# Patient Record
Sex: FEMALE | Race: WHITE | NOT HISPANIC OR LATINO | Employment: FULL TIME | URBAN - METROPOLITAN AREA
[De-identification: names, ages, dates, MRNs, and addresses within clinical notes are randomized per-mention and may not be internally consistent; named-entity substitution may affect disease eponyms.]

---

## 2017-01-04 DIAGNOSIS — N39.0 URINARY TRACT INFECTIOUS DISEASE: Primary | ICD-10-CM

## 2017-01-06 ENCOUNTER — OFFICE VISIT (OUTPATIENT)
Dept: UROLOGY | Facility: CLINIC | Age: 32
End: 2017-01-06
Payer: COMMERCIAL

## 2017-01-06 DIAGNOSIS — N13.722 VESICOURETERAL REFLUX WITH REFLUX NEPHROPATHY, BILATERAL: Primary | ICD-10-CM

## 2017-01-06 LAB
ALBUMIN UR-MCNC: NEGATIVE MG/DL
APPEARANCE UR: CLEAR
BILIRUB UR QL STRIP: NEGATIVE
COLOR UR AUTO: YELLOW
GLUCOSE UR STRIP-MCNC: NEGATIVE MG/DL
HGB UR QL STRIP: ABNORMAL
KETONES UR STRIP-MCNC: NEGATIVE MG/DL
LEUKOCYTE ESTERASE UR QL STRIP: NEGATIVE
NITRATE UR QL: NEGATIVE
PH UR STRIP: 7 PH (ref 5–7)
RBC #/AREA URNS AUTO: 2 /HPF (ref 0–2)
SP GR UR STRIP: 1.02 (ref 1–1.03)
URN SPEC COLLECT METH UR: ABNORMAL
UROBILINOGEN UR STRIP-ACNC: 0.2 EU/DL (ref 0.2–1)
WBC #/AREA URNS AUTO: <1 /HPF (ref 0–2)

## 2017-01-06 PROCEDURE — 99212 OFFICE O/P EST SF 10 MIN: CPT | Mod: 25 | Performed by: UROLOGY

## 2017-01-06 PROCEDURE — 81001 URINALYSIS AUTO W/SCOPE: CPT | Performed by: UROLOGY

## 2017-01-06 PROCEDURE — 52000 CYSTOURETHROSCOPY: CPT | Performed by: UROLOGY

## 2017-01-06 RX ORDER — CIPROFLOXACIN 500 MG/1
500 TABLET, FILM COATED ORAL ONCE
Qty: 1 TABLET | Refills: 0 | Status: SHIPPED | OUTPATIENT
Start: 2017-01-06 | End: 2017-01-06

## 2017-01-06 NOTE — PROGRESS NOTES
Office Visit Note  [facility]  [phone]    Jan 6, 2017    [unfilled]    1985    UROLOGIC DIAGNOSES:    History of ureteral reimplantations    CURRENT INTERVENTIONS:        History:    Lou returns to clinic today for cystoscopy.  She had a noncontrast  CT scan of the abdomen and pelvis performed on December 15 and it showed no abnormalities.  No hydronephrosis or stones. She reports feeling well at this time with no symptoms.      Imaging:  I reviewed her CT scan images.  CT scan normal. Very small left renal angiomyolipoma     Labs:      MEDICATIONS:    Current outpatient prescriptions:      ciprofloxacin (CIPRO) 500 MG tablet, Take 1 tablet (500 mg) by mouth once for 1 dose, Disp: 1 tablet, Rfl: 0     hydrOXYzine (ATARAX) 25 MG tablet, Take 1-2 tablets (25-50 mg) by mouth every 6 hours as needed for anxiety, Disp: 30 tablet, Rfl: 1     citalopram (CELEXA) 20 MG tablet, Take 1 tablet (20 mg) by mouth daily, Disp: 90 tablet, Rfl: 3     multivitamin, therapeutic with minerals (MULTI-VITAMIN) TABS, Take 1 tablet by mouth daily, Disp: 100 tablet, Rfl: 3     levonorgestrel (MIRENA) 20 MCG/24HR IUD, 1 each (20 mcg) by Intrauterine route once, Disp: 1 each, Rfl: 0     SUMAtriptan (IMITREX) 100 MG tablet, May repeat in 2 hours if needed: max 2/day; use for HA or neck pain, Disp: 9 tablet, Rfl: 1    ALLERGIES:   No Known Allergies    REVIEW OF SYSTEMS: Ten point review of systems without change as outlined in HPI    SURGICAL HISTORY:    Past Surgical History   Procedure Laterality Date     Urinary reflux surgery  age 10     Bladder surgery           PHYSICAL EXAM:    There were no vitals taken for this visit.    HEENT: Normocephalic and atraumatic    Cardiac: Not done    Back/Flank: Not done    CNS/PNS: Alert and oriented    Respiratory: Normal nonlabored breathing    Abdomen: Soft nontender and nondistended    Peripheral Vascular: No peripheral edema    Mental Status: Normal    External  Genitalia: normal    Bladder: normal, no evidence of cystocele    Urethra: normal    Vagina: normal vaginal mucosa    Cystoscopy:  I performed flexible cystoscopy today and the bladder was normal throughout.  No tumors identified throughout the bladder. Adjacent to the left ureteral orifice she had 2 small subepithelial cysts and the urothelium, this is likely a reaction to her prior ureteral reimplant.  No evidence of any malignancy.      Urinalysis:  UA RESULTS:  Recent Labs   Lab Test  01/06/17   1431   11/10/16   1531   COLOR  Yellow   < >  Yellow   APPEARANCE  Clear   < >  Clear   URINEGLC  Negative   < >  Negative   URINEBILI  Negative   < >  Negative   URINEKETONE  Negative   < >  Negative   SG  1.020   < >  1.020   UBLD  Trace*   < >  Small*   URINEPH  7.0   < >  6.0   PROTEIN  Negative   < >  Negative   UROBILINOGEN  0.2   < >  0.2   NITRITE  Negative   < >  Negative   LEUKEST  Negative   < >  Negative   RBCU   --    --   O - 2   WBCU   --    --   O - 2    < > = values in this interval not displayed.         IMPRESSION:    Normal cystoscopy    PLAN:    She has had a normal cystoscopy and a normal CT scan. Her urine sample from today was sent for microscopy to rule out hematuria.  The abnormal is seen on the previous ultrasound was likely these 2 small subepithelial cysts near the left orifice that are benign.  She will follow up with me as needed in the future.    Total Time:  15 minutes  Time in Consultation: 10 minutes       Dale Mcintosh M.D.

## 2017-01-06 NOTE — Clinical Note
1/6/2017       RE: Lou Nix  [address] [facility] [address]     Dear Colleague,    Thank you for referring your patient, Lou Nix, to the [facility] [place] at [facility]. Please see a copy of my visit note below.    Office Visit Note  [facility]  [phone]    Jan 6, 2017    [unfilled]    1985    UROLOGIC DIAGNOSES:    History of ureteral reimplantations    CURRENT INTERVENTIONS:        History:    Lou returns to clinic today for cystoscopy.  She had a noncontrast  CT scan of the abdomen and pelvis performed on December 15 and it showed no abnormalities.  No hydronephrosis or stones. She reports feeling well at this time with no symptoms.      Imaging:  I reviewed her CT scan images.  CT scan normal. Very small left renal angiomyolipoma     Labs:      MEDICATIONS:    Current outpatient prescriptions:      ciprofloxacin (CIPRO) 500 MG tablet, Take 1 tablet (500 mg) by mouth once for 1 dose, Disp: 1 tablet, Rfl: 0     hydrOXYzine (ATARAX) 25 MG tablet, Take 1-2 tablets (25-50 mg) by mouth every 6 hours as needed for anxiety, Disp: 30 tablet, Rfl: 1     citalopram (CELEXA) 20 MG tablet, Take 1 tablet (20 mg) by mouth daily, Disp: 90 tablet, Rfl: 3     multivitamin, therapeutic with minerals (MULTI-VITAMIN) TABS, Take 1 tablet by mouth daily, Disp: 100 tablet, Rfl: 3     levonorgestrel (MIRENA) 20 MCG/24HR IUD, 1 each (20 mcg) by Intrauterine route once, Disp: 1 each, Rfl: 0     SUMAtriptan (IMITREX) 100 MG tablet, May repeat in 2 hours if needed: max 2/day; use for HA or neck pain, Disp: 9 tablet, Rfl: 1    ALLERGIES:   No Known Allergies    REVIEW OF SYSTEMS: Ten point review of systems without change as outlined in HPI    SURGICAL HISTORY:    Past Surgical History   Procedure Laterality Date     Urinary reflux surgery  age 10     Bladder surgery           PHYSICAL EXAM:    There were no  vitals taken for this visit.    HEENT: Normocephalic and atraumatic    Cardiac: Not done    Back/Flank: Not done    CNS/PNS: Alert and oriented    Respiratory: Normal nonlabored breathing    Abdomen: Soft nontender and nondistended    Peripheral Vascular: No peripheral edema    Mental Status: Normal    External Genitalia: normal    Bladder: normal, no evidence of cystocele    Urethra: normal    Vagina: normal vaginal mucosa    Cystoscopy:  I performed flexible cystoscopy today and the bladder was normal throughout.  No tumors identified throughout the bladder. Adjacent to the left ureteral orifice she had 2 small subepithelial cysts and the urothelium, this is likely a reaction to her prior ureteral reimplant.  No evidence of any malignancy.      Urinalysis:  UA RESULTS:  Recent Labs   Lab Test  01/06/17   1431   11/10/16   1531   COLOR  Yellow   < >  Yellow   APPEARANCE  Clear   < >  Clear   URINEGLC  Negative   < >  Negative   URINEBILI  Negative   < >  Negative   URINEKETONE  Negative   < >  Negative   SG  1.020   < >  1.020   UBLD  Trace*   < >  Small*   URINEPH  7.0   < >  6.0   PROTEIN  Negative   < >  Negative   UROBILINOGEN  0.2   < >  0.2   NITRITE  Negative   < >  Negative   LEUKEST  Negative   < >  Negative   RBCU   --    --   O - 2   WBCU   --    --   O - 2    < > = values in this interval not displayed.         IMPRESSION:    Normal cystoscopy          PLAN:    She has had a normal cystoscopy and a normal CT scan. Her urine sample from today was sent for microscopy to rule out hematuria.  The abnormal is seen on the previous ultrasound was likely these 2 small subepithelial cysts near the left orifice that are benign.  She will follow up with me as needed in the future.    Total Time:  15 minutes  Time in Consultation: 10 minutes       Dale Mcintosh M.D.

## 2017-01-06 NOTE — PATIENT INSTRUCTIONS
"     AFTER YOUR CYSTOSCOPY         You have just completed a cystoscopy, or \"cysto\", which allowed your physician to learn more about your bladder (or to remove a stent placed after surgery). We suggest that you continue to avoid caffeine, fruit juice, and alcohol for the next 24 hours, however, you are encouraged to return to your normal activities.       A few things that are considered normal after your cystoscopy:    * small amount of bleeding (or spotting) that clears within the next 24 hours    * slight burning sensation with urination    * sensation to of needing to avoid more frequently    * the feeling of \"air\" in your urine    * mild discomfort that is relieved with Tylonol        Please contact our office promptly if you:    * develop a fever above 101 degrees    * are unable to urinate    * develop bright red blood that does not stop    * severe pain or swelling        And of course, please contact our office with any concerns or questions [phone]      "

## 2017-01-06 NOTE — NURSING NOTE
Pt has signed the consent form stating that we will be doing a CYSTOSCOPY (with or without stent removal) today, and that it is the correct procedure. I verbally confirmed the patient s identity using two indicators, relevant allergies, and that the correct equipment was available. Post-op information given to the pt as needed at check-out. I have sent an appropriate antibiotic to the pharmacy in our building as recommended by the MD.     JEWEL Wright

## 2017-01-06 NOTE — MR AVS SNAPSHOT
"              After Visit Summary   1/6/2017    Lou Nix    MRN: [id number]           Patient Information     Date Of Birth          1985        Visit Information        Provider Department      1/6/2017 2:20 PM Dale Mcintosh MD; [facility] [place]        Today's Diagnoses     Urinary tract infectious disease           Care Instructions         AFTER YOUR CYSTOSCOPY         You have just completed a cystoscopy, or \"cysto\", which allowed your physician to learn more about your bladder (or to remove a stent placed after surgery). We suggest that you continue to avoid caffeine, fruit juice, and alcohol for the next 24 hours, however, you are encouraged to return to your normal activities.       A few things that are considered normal after your cystoscopy:    * small amount of bleeding (or spotting) that clears within the next 24 hours    * slight burning sensation with urination    * sensation to of needing to avoid more frequently    * the feeling of \"air\" in your urine    * mild discomfort that is relieved with Tylonol        Please contact our office promptly if you:    * develop a fever above 101 degrees    * are unable to urinate    * develop bright red blood that does not stop    * severe pain or swelling        And of course, please contact our office with any concerns or questions [phone]            Follow-ups after your visit        Who to contact     If you have questions or need follow up information about today's clinic visit or your schedule please contact [facility] [place] directly at [phone].  Normal or non-critical lab and imaging results will be communicated to you by MyChart, letter or phone within 4 business days after the clinic has received the results. If you do not hear from us within 7 days, please contact the clinic through MyChart or phone. If you have a critical or abnormal lab " result, we will notify you by phone as soon as possible.  Submit refill requests through mytrax or call your pharmacy and they will forward the refill request to us. Please allow 3 business days for your refill to be completed.          Additional Information About Your Visit        [employer] [employer] Information     mytrax gives you secure access to your electronic health record. If you see a primary care provider, you can also send messages to your care team and make appointments. If you have questions, please call your primary care clinic.  If you do not have a primary care provider, please call [phone] and they will assist you.        Care EveryWhere ID     This is your Care EveryWhere ID. This could be used by other organizations to access your [facility] medical records  [id number]         Blood Pressure from Last 3 Encounters:   11/10/16 102/70   10/31/16 114/78   01/27/16 110/76    Weight from Last 3 Encounters:   12/14/16 85.73 kg (189 lb)   11/10/16 86.41 kg (190 lb 8 oz)   10/31/16 85.458 kg (188 lb 6.4 oz)              We Performed the Following     UA without Microscopic [[id number]]          Today's Medication Changes          These changes are accurate as of: 1/6/17  2:56 PM.  If you have any questions, ask your nurse or doctor.               Start taking these medicines.        Dose/Directions    ciprofloxacin 500 MG tablet   Commonly known as:  CIPRO   Used for:  Urinary tract infectious disease        Dose:  500 mg   Take 1 tablet (500 mg) by mouth once for 1 dose   Quantity:  1 tablet   Refills:  0         Stop taking these medicines if you haven't already. Please contact your care team if you have questions.     amoxicillin-clavulanate 875-125 MG per tablet   Commonly known as:  AUGMENTIN                Where to get your medicines      These medications were sent to [facility]/pharmacy #[id number] - [place], [place] - [address]  [address] [facility] [address]     Phone:  [phone] -  ciprofloxacin 500 MG tablet             Primary Care Provider Office Phone # Fax #    Rita Arreguin [phone]       [address] [place] [address]        Thank you!     Thank you for choosing [facility] [place]  for your care. Our goal is always to provide you with excellent care. Hearing back from our patients is one way we can continue to improve our services. Please take a few minutes to complete the written survey that you may receive in the mail after your visit with us. Thank you!             Your Updated Medication List - Protect others around you: Learn how to safely use, store and throw away your medicines at www.disposemymeds.org.          This list is accurate as of: 1/6/17  2:56 PM.  Always use your most recent med list.                   Brand Name Dispense Instructions for use    ciprofloxacin 500 MG tablet    CIPRO    1 tablet    Take 1 tablet (500 mg) by mouth once for 1 dose       citalopram 20 MG tablet    celeXA    90 tablet    Take 1 tablet (20 mg) by mouth daily       hydrOXYzine 25 MG tablet    ATARAX    30 tablet    Take 1-2 tablets (25-50 mg) by mouth every 6 hours as needed for anxiety       levonorgestrel 20 MCG/24HR IUD    MIRENA    1 each    1 each (20 mcg) by Intrauterine route once       Multi-vitamin Tabs tablet     100 tablet    Take 1 tablet by mouth daily       SUMAtriptan 100 MG tablet    IMITREX    9 tablet    May repeat in 2 hours if needed: max 2/day; use for HA or neck pain

## 2017-05-25 ENCOUNTER — MYC MEDICAL ADVICE (OUTPATIENT)
Dept: PEDIATRICS | Facility: CLINIC | Age: 32
End: 2017-05-25

## 2017-05-25 ENCOUNTER — TELEPHONE (OUTPATIENT)
Dept: PEDIATRICS | Facility: CLINIC | Age: 32
End: 2017-05-25

## 2017-05-25 NOTE — LETTER
My Depression Action Plan  Name: Lou Nix   Date of Birth 1985  Date: 5/25/2017    My doctor: Rita Arreguin   My clinic: [facility]  [address]  [address]  [place] [address] [phone]          GREEN    ZONE   Good Control    What it looks like:     Things are going generally well. You have normal up s and down s. You may even feel depressed from time to time, but bad moods usually last less than a day.   What you need to do:  1. Continue to care for yourself (see self care plan)  2. Check your depression survival kit and update it as needed  3. Follow your physician s recommendations including any medication.  4. Do not stop taking medication unless you consult with your physician first.           YELLOW         ZONE Getting Worse    What it looks like:     Depression is starting to interfere with your life.     It may be hard to get out of bed; you may be starting to isolate yourself from others.    Symptoms of depression are starting to last most all day and this has happened for several days.     You may have suicidal thoughts but they are not constant.   What you need to do:     1. Call your care team, your response to treatment will improve if you keep your care team informed of your progress. Yellow periods are signs an adjustment may need to be made.     2. Continue your self-care, even if you have to fake it!    3. Talk to someone in your support network    4. Open up your depression survival kit           RED    ZONE Medical Alert - Get Help    What it looks like:     Depression is seriously interfering with your life.     You may experience these or other symptoms: You can t get out of bed most days, can t work or engage in other necessary activities, you have trouble taking care of basic hygiene, or basic responsibilities, thoughts of suicide or death that will not go away, self-injurious behavior.     What you need to do:  1. Call  your care team and request a same-day appointment. If they are not available (weekends or after hours) call your local crisis line, emergency room or 911.      Electronically signed by: Carol Monroe, May 25, 2017    Depression Self Care Plan / Survival Kit    Self-Care for Depression  Here s the deal. Your body and mind are really not as separate as most people think.  What you do and think affects how you feel and how you feel influences what you do and think. This means if you do things that people who feel good do, it will help you feel better.  Sometimes this is all it takes.  There is also a place for medication and therapy depending on how severe your depression is, so be sure to consult with your medical provider and/ or Behavioral Health Consultant if your symptoms are worsening or not improving.     In order to better manage my stress, I will:    Exercise  Get some form of exercise, every day. This will help reduce pain and release endorphins, the  feel good  chemicals in your brain. This is almost as good as taking antidepressants!  This is not the same as joining a gym and then never going! (they count on that by the way ) It can be as simple as just going for a walk or doing some gardening, anything that will get you moving.      Hygiene   Maintain good hygiene (Get out of bed in the morning, Make your bed, Brush your teeth, Take a shower, and Get dressed like you were going to work, even if you are unemployed).  If your clothes don't fit try to get ones that do.    Diet  I will strive to eat foods that are good for me, drink plenty of water, and avoid excessive sugar, caffeine, alcohol, and other mood-altering substances.  Some foods that are helpful in depression are: complex carbohydrates, B vitamins, flaxseed, fish or fish oil, fresh fruits and vegetables.    Psychotherapy  I agree to participate in Individual Therapy (if recommended).    Medication  If prescribed medications, I agree to  take them.  Missing doses can result in serious side effects.  I understand that drinking alcohol, or other illicit drug use, may cause potential side effects.  I will not stop my medication abruptly without first discussing it with my provider.    Staying Connected With Others  I will stay in touch with my friends, family members, and my primary care provider/team.    Use your imagination  Be creative.  We all have a creative side; it doesn t matter if it s oil painting, sand castles, or mud pies! This will also kick up the endorphins.    Witness Beauty  (AKA stop and smell the roses) Take a look outside, even in mid-winter. Notice colors, textures. Watch the squirrels and birds.     Service to others  Be of service to others.  There is always someone else in need.  By helping others we can  get out of ourselves  and remember the really important things.  This also provides opportunities for practicing all the other parts of the program.    Humor  Laugh and be silly!  Adjust your TV habits for less news and crime-drama and more comedy.    Control your stress  Try breathing deep, massage therapy, biofeedback, and meditation. Find time to relax each day.     My support system    Clinic Contact:  Phone number:    Contact 1:  Phone number:    Contact 2:  Phone number:    [facility]/:  Phone number:    Therapist:  Phone number:    Local crisis center:    Phone number:    Other community support:  Phone number:

## 2017-07-18 NOTE — TELEPHONE ENCOUNTER
Chief Complaint   Patient presents with    Annual Wellness Visit     blood pressure recheck     1. Have you been to the ER, urgent care clinic since your last visit? Hospitalized since your last visit? No    2. Have you seen or consulted any other health care providers outside of the [address] [place] [address] since your last visit? Include any pap smears or colon screening. No     Hx htn    Knees are bothering patient a lot--wants to see about getting referral to orthopeadic specialist--thinking about knee replacements    Had eye exam last year    Needs pneumococcal vaccine    Had a colonsopy within the last couple years--everyting came back normal      General Health Questions   -During the past 4 weeks:   -how would you rate your health in general? Fair   -how often have you been bothered by feeling dizzy when standing up? 0   -how much have you been bothered by bodily pain? moderately   -Have you noticed any hearing difficulties? no   -has your physical and emotional health limited your social activities with family or friends? no    Emotional Health Questions   -Do you have a history of depression, anxiety, or emotional problems? no  -Over the past 2 weeks, have you felt down, depressed or hopeless? no  -Over the past 2 weeks, have you felt little interest or pleasure in doing things? no    Health Habits   Please describe your diet habits: lower carv  Do you get 5 servings of fruits or vegetables daily? yes  Do you exercise regularly? yes    Activities of Daily Living and Functional Status   -Do you need help with eating, walking, dressing, bathing, toileting, the phone, transportation, shopping, preparing meals, housework, laundry, medications or managing money? no  -In the past four weeks, was someone available to help you if you needed and wanted help with anything? yes  -Are you confident are you that you can control and manage most of your health problems?  yes  -Have you been given information to help you Type of outreach:  Phone, left message for patient to call back.   Health Maintenance Due   Topic Date Due     DEPRESSION ACTION PLAN Q1 YR  01/27/2017     PHQ-9 Q6 MONTHS  05/10/2017     Updated DAP, please update PHQ 9 if pt calls back.     Carol Monroe MA       keep track of your medications? yes  -How often do you have trouble taking your medications as prescribed? not    Fall Risk and Home Safety   Have you fallen 2 or more times in the past year? no  Does your home have rugs in the hallway, lack grab bars in the bathroom, lack handrails on the stairs or have poor lighting? no  Do you have smoke detectors and check them regularly?  yes  Do you have difficulties driving a car? no  Do you always fasten your seat belt when you are in a car?no

## 2017-12-05 ASSESSMENT — PATIENT HEALTH QUESTIONNAIRE - PHQ9
SUM OF ALL RESPONSES TO PHQ QUESTIONS 1-9: 7
SUM OF ALL RESPONSES TO PHQ QUESTIONS 1-9: 7
10. IF YOU CHECKED OFF ANY PROBLEMS, HOW DIFFICULT HAVE THESE PROBLEMS MADE IT FOR YOU TO DO YOUR WORK, TAKE CARE OF THINGS AT HOME, OR GET ALONG WITH OTHER PEOPLE: NOT DIFFICULT AT ALL

## 2017-12-05 NOTE — TELEPHONE ENCOUNTER
PHQ9 updated, score is 7. Advised on appointment.     PHQ-9 score:    PHQ-9 SCORE 12/5/2017   Total Score -   Total Score MyChart 7 (Mild depression)   Total Score 7

## 2017-12-06 ASSESSMENT — PATIENT HEALTH QUESTIONNAIRE - PHQ9: SUM OF ALL RESPONSES TO PHQ QUESTIONS 1-9: 7

## 2018-07-05 ENCOUNTER — OFFICE VISIT (OUTPATIENT)
Dept: PEDIATRICS | Facility: CLINIC | Age: 33
End: 2018-07-05
Payer: COMMERCIAL

## 2018-07-05 VITALS
WEIGHT: 178 LBS | DIASTOLIC BLOOD PRESSURE: 64 MMHG | TEMPERATURE: 98.3 F | SYSTOLIC BLOOD PRESSURE: 96 MMHG | HEIGHT: 66 IN | OXYGEN SATURATION: 98 % | HEART RATE: 67 BPM | BODY MASS INDEX: 28.61 KG/M2

## 2018-07-05 DIAGNOSIS — Z00.00 ROUTINE GENERAL MEDICAL EXAMINATION AT A HEALTH CARE FACILITY: Primary | ICD-10-CM

## 2018-07-05 DIAGNOSIS — Z23 NEED FOR HEPATITIS A AND B VACCINATION: ICD-10-CM

## 2018-07-05 DIAGNOSIS — F41.1 GAD (GENERALIZED ANXIETY DISORDER): ICD-10-CM

## 2018-07-05 DIAGNOSIS — F33.8 SEASONAL AFFECTIVE DISORDER (H): ICD-10-CM

## 2018-07-05 DIAGNOSIS — R79.89 ELEVATED LIVER FUNCTION TESTS: ICD-10-CM

## 2018-07-05 DIAGNOSIS — G43.009 MIGRAINE WITHOUT AURA AND WITHOUT STATUS MIGRAINOSUS, NOT INTRACTABLE: ICD-10-CM

## 2018-07-05 DIAGNOSIS — N39.3 FEMALE STRESS INCONTINENCE: ICD-10-CM

## 2018-07-05 DIAGNOSIS — F32.0 MAJOR DEPRESSIVE DISORDER, SINGLE EPISODE, MILD (H): ICD-10-CM

## 2018-07-05 DIAGNOSIS — D22.9 MULTIPLE PIGMENTED NEVI: ICD-10-CM

## 2018-07-05 LAB
ALBUMIN SERPL-MCNC: 3.9 G/DL (ref 3.4–5)
ALP SERPL-CCNC: 53 U/L (ref 40–150)
ALT SERPL W P-5'-P-CCNC: 55 U/L (ref 0–50)
ANION GAP SERPL CALCULATED.3IONS-SCNC: 5 MMOL/L (ref 3–14)
AST SERPL W P-5'-P-CCNC: 23 U/L (ref 0–45)
BILIRUB SERPL-MCNC: 1.1 MG/DL (ref 0.2–1.3)
BUN SERPL-MCNC: 17 MG/DL (ref 7–30)
CALCIUM SERPL-MCNC: 9.2 MG/DL (ref 8.5–10.1)
CHLORIDE SERPL-SCNC: 106 MMOL/L (ref 94–109)
CHOLEST SERPL-MCNC: 171 MG/DL
CO2 SERPL-SCNC: 29 MMOL/L (ref 20–32)
CREAT SERPL-MCNC: 0.86 MG/DL (ref 0.52–1.04)
ERYTHROCYTE [DISTWIDTH] IN BLOOD BY AUTOMATED COUNT: 11.6 % (ref 10–15)
GFR SERPL CREATININE-BSD FRML MDRD: 77 ML/MIN/1.7M2
GLUCOSE SERPL-MCNC: 91 MG/DL (ref 70–99)
HCT VFR BLD AUTO: 40.4 % (ref 35–47)
HDLC SERPL-MCNC: 41 MG/DL
HGB BLD-MCNC: 14.8 G/DL (ref 11.7–15.7)
LDLC SERPL CALC-MCNC: 110 MG/DL
MCH RBC QN AUTO: 32.2 PG (ref 26.5–33)
MCHC RBC AUTO-ENTMCNC: 36.6 G/DL (ref 31.5–36.5)
MCV RBC AUTO: 88 FL (ref 78–100)
NONHDLC SERPL-MCNC: 130 MG/DL
PLATELET # BLD AUTO: 285 10E9/L (ref 150–450)
POTASSIUM SERPL-SCNC: 3.9 MMOL/L (ref 3.4–5.3)
PROT SERPL-MCNC: 6.9 G/DL (ref 6.8–8.8)
RBC # BLD AUTO: 4.59 10E12/L (ref 3.8–5.2)
SODIUM SERPL-SCNC: 140 MMOL/L (ref 133–144)
TRIGL SERPL-MCNC: 101 MG/DL
WBC # BLD AUTO: 6.2 10E9/L (ref 4–11)

## 2018-07-05 PROCEDURE — 99395 PREV VISIT EST AGE 18-39: CPT | Mod: 25 | Performed by: PEDIATRICS

## 2018-07-05 PROCEDURE — 80061 LIPID PANEL: CPT | Performed by: PEDIATRICS

## 2018-07-05 PROCEDURE — 90471 IMMUNIZATION ADMIN: CPT | Performed by: PEDIATRICS

## 2018-07-05 PROCEDURE — 85027 COMPLETE CBC AUTOMATED: CPT | Performed by: PEDIATRICS

## 2018-07-05 PROCEDURE — 90636 HEP A/HEP B VACC ADULT IM: CPT | Performed by: PEDIATRICS

## 2018-07-05 PROCEDURE — 80053 COMPREHEN METABOLIC PANEL: CPT | Performed by: PEDIATRICS

## 2018-07-05 PROCEDURE — 36415 COLL VENOUS BLD VENIPUNCTURE: CPT | Performed by: PEDIATRICS

## 2018-07-05 PROCEDURE — 99213 OFFICE O/P EST LOW 20 MIN: CPT | Mod: 25 | Performed by: PEDIATRICS

## 2018-07-05 RX ORDER — SUMATRIPTAN 100 MG/1
TABLET, FILM COATED ORAL
Qty: 9 TABLET | Refills: 3 | Status: SHIPPED | OUTPATIENT
Start: 2018-07-05 | End: 2019-12-31

## 2018-07-05 ASSESSMENT — ENCOUNTER SYMPTOMS
FREQUENCY: 0
NAUSEA: 0
COUGH: 0
CONSTIPATION: 0
DIARRHEA: 0
ABDOMINAL PAIN: 0
PARESTHESIAS: 0
HEMATOCHEZIA: 0
WEAKNESS: 0
PALPITATIONS: 0
MYALGIAS: 0
BREAST MASS: 0
DYSURIA: 0
HEARTBURN: 0
NERVOUS/ANXIOUS: 0
DIZZINESS: 0
ARTHRALGIAS: 0
HEMATURIA: 0
JOINT SWELLING: 0
SHORTNESS OF BREATH: 0
HEADACHES: 0
CHILLS: 0
EYE PAIN: 0
SORE THROAT: 0
FEVER: 0

## 2018-07-05 ASSESSMENT — ANXIETY QUESTIONNAIRES
2. NOT BEING ABLE TO STOP OR CONTROL WORRYING: NOT AT ALL
3. WORRYING TOO MUCH ABOUT DIFFERENT THINGS: SEVERAL DAYS
1. FEELING NERVOUS, ANXIOUS, OR ON EDGE: SEVERAL DAYS
GAD7 TOTAL SCORE: 3
5. BEING SO RESTLESS THAT IT IS HARD TO SIT STILL: NOT AT ALL
IF YOU CHECKED OFF ANY PROBLEMS ON THIS QUESTIONNAIRE, HOW DIFFICULT HAVE THESE PROBLEMS MADE IT FOR YOU TO DO YOUR WORK, TAKE CARE OF THINGS AT HOME, OR GET ALONG WITH OTHER PEOPLE: NOT DIFFICULT AT ALL
6. BECOMING EASILY ANNOYED OR IRRITABLE: SEVERAL DAYS
7. FEELING AFRAID AS IF SOMETHING AWFUL MIGHT HAPPEN: NOT AT ALL

## 2018-07-05 ASSESSMENT — PATIENT HEALTH QUESTIONNAIRE - PHQ9: 5. POOR APPETITE OR OVEREATING: NOT AT ALL
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
## 2018-07-05 NOTE — MR AVS SNAPSHOT
After Visit Summary   7/5/2018    Lou Nix    MRN: [id number]           Patient Information     Date Of Birth          1985        Visit Information        Provider Department      7/5/2018 8:00 AM Rita Arreguin MD [facility]        Today's Diagnoses     Routine general medical examination at a health care facility    -  1    Cervicalgia        Female stress incontinence        Need for hepatitis A and B vaccination          Care Instructions    Expect a phone call to schedule pelvic floor physical therapy - alert me if not improving    Think about counseling    Winter exercise plan - keep an eye your mood    Labs today - first floor    Hep A/B shot today - next one in 1 month, last one at 6 months            Preventive Health Recommendations  Female Ages 26 - 39  Yearly exam:   See your health care provider every year in order to    Review health changes.     Discuss preventive care.      Review your medicines if you your doctor has prescribed any.    Until age 30: Get a Pap test every three years (more often if you have had an abnormal result).    After age 30: Talk to your doctor about whether you should have a Pap test every 3 years or have a Pap test with HPV screening every 5 years.   You do not need a Pap test if your uterus was removed (hysterectomy) and you have not had cancer.  You should be tested each year for STDs (sexually transmitted diseases), if you're at risk.   Talk to your provider about how often to have your cholesterol checked.  If you are at risk for diabetes, you should have a diabetes test (fasting glucose).  Shots: Get a flu shot each year. Get a tetanus shot every 10 years.   Nutrition:     Eat at least 5 servings of fruits and vegetables each day.    Eat whole-grain bread, whole-wheat pasta and brown rice instead of white grains and rice.    Get adequate Calcium and Vitamin D.     Lifestyle    Exercise at least 150 minutes a week (30 minutes  "a day, 5 days of the week). This will help you control your weight and prevent disease.    Limit alcohol to one drink per day.    No smoking.     Wear sunscreen to prevent skin cancer.    See your dentist every six months for an exam and cleaning.            Follow-ups after your visit        Additional Services     PHYSICAL THERAPY REFERRAL       *This therapy referral will be filtered to a centralized scheduling office at [facility] and the patient will receive a call to schedule an appointment at a [facility] location most convenient for them. *     [facility] provides Physical Therapy evaluation and treatment and many specialty services across the [facility] system.  If requesting a specialty program, please choose from the list below.    If you have not heard from the scheduling office within 2 business days, please call [phone] for all locations, with the exception of [place], please call [phone] and [facility], please call [phone]  Treatment: Evaluation & Treatment  Special Instructions/Modalities: pelvic floor physical therapy  Special Programs: Incontinence Pelvic Floor Program    Please be aware that coverage of these services is subject to the terms and limitations of your health insurance plan.  Call member services at your health plan with any benefit or coverage questions.      **Note to Provider:  If you are referring outside of [facility] for the therapy appointment, please list the name of the location in the \"special instructions\" above, print the referral and give to the patient to schedule the appointment.                  Who to contact     If you have questions or need follow up information about today's clinic visit or your schedule please contact [facility] directly at [phone].  Normal or non-critical lab and imaging results will be communicated to you by MyChart, letter or phone within 4 business days after the clinic has " "received the results. If you do not hear from us within 7 days, please contact the clinic through [employer] or phone. If you have a critical or abnormal lab result, we will notify you by phone as soon as possible.  Submit refill requests through [employer] or call your pharmacy and they will forward the refill request to us. Please allow 3 business days for your refill to be completed.          Additional Information About Your Visit        [employer] Information     [employer] gives you secure access to your electronic health record. If you see a primary care provider, you can also send messages to your care team and make appointments. If you have questions, please call your primary care clinic.  If you do not have a primary care provider, please call [phone] and they will assist you.        Care EveryWhere ID     This is your Care EveryWhere ID. This could be used by other organizations to access your [facility] medical records  [id number]        Your Vitals Were     Pulse Temperature Height Last Period Pulse Oximetry BMI (Body Mass Index)    67 98.3  F (36.8  C) (Tympanic) 5' 5.5\" (1.664 m) 06/20/2018 (Approximate) 98% 29.17 kg/m2       Blood Pressure from Last 3 Encounters:   07/05/18 96/64   11/10/16 102/70   10/31/16 114/78    Weight from Last 3 Encounters:   07/05/18 178 lb (80.7 kg)   12/14/16 189 lb (85.7 kg)   11/10/16 190 lb 8 oz (86.4 kg)              We Performed the Following     CBC with platelets     Comprehensive metabolic panel     DEPRESSION ACTION PLAN (DAP)     HEPA/HEPB VACCINE ADULT IM     Lipid panel reflex to direct LDL Fasting     PHYSICAL THERAPY REFERRAL          Where to get your medicines      These medications were sent to [facility] [address] [facility] [address] AT [place] [address]  [address] [facility] [address]     Phone:  [phone]     SUMAtriptan 100 MG tablet          Primary Care Provider Office Phone # Fax #    Rita Arreguin, " [phone] [address] [place] [address]        Equal Access to Services     [facility] : Hadii aad ku hadmandeepmarianne Andreeali, wayuanda yulimahendraha, dianata kalatonyada marinajob, waxrima cristhianin hayaajean marie graykyree harley laHeatherjorden gregg. So [facility] [phone].    ATENCIÓN: Si habla español, tiene a gregory disposición servicios gratuitos de asistencia lingüística. Llame al [phone].    We comply with applicable federal civil rights laws and [place] laws. We do not discriminate on the basis of race, color, national origin, age, disability, sex, sexual orientation, or gender identity.            Thank you!     Thank you for choosing [facility]  for your care. Our goal is always to provide you with excellent care. Hearing back from our patients is one way we can continue to improve our services. Please take a few minutes to complete the written survey that you may receive in the mail after your visit with us. Thank you!             Your Updated Medication List - Protect others around you: Learn how to safely use, store and throw away your medicines at www.disposemymeds.org.          This list is accurate as of 7/5/18  8:52 AM.  Always use your most recent med list.                   Brand Name Dispense Instructions for use Diagnosis    levonorgestrel 20 MCG/24HR IUD    MIRENA    1 each    1 each (20 mcg) by Intrauterine route once    Routine general medical examination at a health care facility       Multi-vitamin Tabs tablet     100 tablet    Take 1 tablet by mouth daily    Routine general medical examination at a health care facility       SUMAtriptan 100 MG tablet    IMITREX    9 tablet    May repeat in 2 hours if needed: max 2/day; use for HA or neck pain    Cervicalgia

## 2018-07-05 NOTE — LETTER
My Depression Action Plan  Name: Lou Nix   Date of Birth 1985  Date: 7/5/2018    My doctor: Rita Arreguin   My clinic: [facility]  [address]  [address]  [place] [address] [phone]          GREEN    ZONE   Good Control    What it looks like:     Things are going generally well. You have normal up s and down s. You may even feel depressed from time to time, but bad moods usually last less than a day.   What you need to do:  1. Continue to care for yourself (see self care plan)  2. Check your depression survival kit and update it as needed  3. Follow your physician s recommendations including any medication.  4. Do not stop taking medication unless you consult with your physician first.           YELLOW         ZONE Getting Worse    What it looks like:     Depression is starting to interfere with your life.     It may be hard to get out of bed; you may be starting to isolate yourself from others.    Symptoms of depression are starting to last most all day and this has happened for several days.     You may have suicidal thoughts but they are not constant.   What you need to do:     1. Call your care team, your response to treatment will improve if you keep your care team informed of your progress. Yellow periods are signs an adjustment may need to be made.     2. Continue your self-care, even if you have to fake it!    3. Talk to someone in your support network    4. Open up your depression survival kit           RED    ZONE Medical Alert - Get Help    What it looks like:     Depression is seriously interfering with your life.     You may experience these or other symptoms: You can t get out of bed most days, can t work or engage in other necessary activities, you have trouble taking care of basic hygiene, or basic responsibilities, thoughts of suicide or death that will not go away, self-injurious behavior.     What you need to do:  1. Call your  care team and request a same-day appointment. If they are not available (weekends or after hours) call your local crisis line, emergency room or 911.            Depression Self Care Plan / Survival Kit    Self-Care for Depression  Here s the deal. Your body and mind are really not as separate as most people think.  What you do and think affects how you feel and how you feel influences what you do and think. This means if you do things that people who feel good do, it will help you feel better.  Sometimes this is all it takes.  There is also a place for medication and therapy depending on how severe your depression is, so be sure to consult with your medical provider and/ or Behavioral Health Consultant if your symptoms are worsening or not improving.     In order to better manage my stress, I will:    Exercise  Get some form of exercise, every day. This will help reduce pain and release endorphins, the  feel good  chemicals in your brain. This is almost as good as taking antidepressants!  This is not the same as joining a gym and then never going! (they count on that by the way ) It can be as simple as just going for a walk or doing some gardening, anything that will get you moving.      Hygiene   Maintain good hygiene (Get out of bed in the morning, Make your bed, Brush your teeth, Take a shower, and Get dressed like you were going to work, even if you are unemployed).  If your clothes don't fit try to get ones that do.    Diet  I will strive to eat foods that are good for me, drink plenty of water, and avoid excessive sugar, caffeine, alcohol, and other mood-altering substances.  Some foods that are helpful in depression are: complex carbohydrates, B vitamins, flaxseed, fish or fish oil, fresh fruits and vegetables.    Psychotherapy  I agree to participate in Individual Therapy (if recommended).    Medication  If prescribed medications, I agree to take them.  Missing doses can result in serious side effects.  I  understand that drinking alcohol, or other illicit drug use, may cause potential side effects.  I will not stop my medication abruptly without first discussing it with my provider.    Staying Connected With Others  I will stay in touch with my friends, family members, and my primary care provider/team.    Use your imagination  Be creative.  We all have a creative side; it doesn t matter if it s oil painting, sand castles, or mud pies! This will also kick up the endorphins.    Witness Beauty  (AKA stop and smell the roses) Take a look outside, even in mid-winter. Notice colors, textures. Watch the squirrels and birds.     Service to others  Be of service to others.  There is always someone else in need.  By helping others we can  get out of ourselves  and remember the really important things.  This also provides opportunities for practicing all the other parts of the program.    Humor  Laugh and be silly!  Adjust your TV habits for less news and crime-drama and more comedy.    Control your stress  Try breathing deep, massage therapy, biofeedback, and meditation. Find time to relax each day.     My support system    Clinic Contact:  Phone number:    Contact 1:  Phone number:    Contact 2:  Phone number:    Faith/:  Phone number:    Therapist:  Phone number:    Local crisis center:    Phone number:    Other community support:  Phone number:

## 2018-07-06 ENCOUNTER — MYC MEDICAL ADVICE (OUTPATIENT)
Dept: PEDIATRICS | Facility: CLINIC | Age: 33
End: 2018-07-06

## 2018-07-06 ASSESSMENT — PATIENT HEALTH QUESTIONNAIRE - PHQ9: SUM OF ALL RESPONSES TO PHQ QUESTIONS 1-9: 3

## 2018-07-06 ASSESSMENT — ANXIETY QUESTIONNAIRES: GAD7 TOTAL SCORE: 3

## 2018-07-06 NOTE — TELEPHONE ENCOUNTER
Question about lab results-liver function test.  Informed pt of 's recommendation.    Delphine Alvarado RN

## 2018-07-20 ENCOUNTER — NURSE TRIAGE (OUTPATIENT)
Dept: NURSING | Facility: CLINIC | Age: 33
End: 2018-07-20

## 2018-07-20 ENCOUNTER — OFFICE VISIT (OUTPATIENT)
Dept: URGENT CARE | Facility: URGENT CARE | Age: 33
End: 2018-07-20
Payer: COMMERCIAL

## 2018-07-20 ENCOUNTER — TELEPHONE (OUTPATIENT)
Dept: URGENT CARE | Facility: URGENT CARE | Age: 33
End: 2018-07-20

## 2018-07-20 VITALS
HEART RATE: 55 BPM | DIASTOLIC BLOOD PRESSURE: 70 MMHG | TEMPERATURE: 98.3 F | SYSTOLIC BLOOD PRESSURE: 114 MMHG | OXYGEN SATURATION: 98 %

## 2018-07-20 DIAGNOSIS — R10.9 FLANK PAIN: ICD-10-CM

## 2018-07-20 DIAGNOSIS — N10 ACUTE PYELONEPHRITIS: Primary | ICD-10-CM

## 2018-07-20 LAB
ALBUMIN UR-MCNC: NEGATIVE MG/DL
APPEARANCE UR: CLEAR
BACTERIA #/AREA URNS HPF: ABNORMAL /HPF
BILIRUB UR QL STRIP: NEGATIVE
COLOR UR AUTO: YELLOW
GLUCOSE UR STRIP-MCNC: NEGATIVE MG/DL
HGB UR QL STRIP: ABNORMAL
KETONES UR STRIP-MCNC: NEGATIVE MG/DL
LEUKOCYTE ESTERASE UR QL STRIP: ABNORMAL
NITRATE UR QL: NEGATIVE
NON-SQ EPI CELLS #/AREA URNS LPF: ABNORMAL /LPF
PH UR STRIP: 6.5 PH (ref 5–7)
RBC #/AREA URNS AUTO: ABNORMAL /HPF
SOURCE: ABNORMAL
SP GR UR STRIP: <=1.005 (ref 1–1.03)
UROBILINOGEN UR STRIP-ACNC: 0.2 EU/DL (ref 0.2–1)
WBC #/AREA URNS AUTO: ABNORMAL /HPF

## 2018-07-20 PROCEDURE — 87186 SC STD MICRODIL/AGAR DIL: CPT | Performed by: FAMILY MEDICINE

## 2018-07-20 PROCEDURE — 99213 OFFICE O/P EST LOW 20 MIN: CPT | Performed by: FAMILY MEDICINE

## 2018-07-20 PROCEDURE — 81001 URINALYSIS AUTO W/SCOPE: CPT | Performed by: FAMILY MEDICINE

## 2018-07-20 PROCEDURE — 87088 URINE BACTERIA CULTURE: CPT | Performed by: FAMILY MEDICINE

## 2018-07-20 PROCEDURE — 87086 URINE CULTURE/COLONY COUNT: CPT | Performed by: FAMILY MEDICINE

## 2018-07-20 RX ORDER — CIPROFLOXACIN 500 MG/1
500 TABLET, FILM COATED ORAL 2 TIMES DAILY
Qty: 14 TABLET | Refills: 0 | Status: SHIPPED | OUTPATIENT
Start: 2018-07-20 | End: 2018-07-27

## 2018-07-20 RX ORDER — CEFTRIAXONE 1 G/1
1000 INJECTION, POWDER, FOR SOLUTION INTRAMUSCULAR; INTRAVENOUS ONCE
Qty: 10 ML | Refills: 0 | OUTPATIENT
Start: 2018-07-20 | End: 2018-07-20

## 2018-07-20 NOTE — PATIENT INSTRUCTIONS
Drink plenty of liquids.     Go to the emergency room if you develop severe lightheadedness, if you develop severe vomiting, or if you develop worsening fevers.      follow up with the primary care provider if not better in 7 days.

## 2018-07-20 NOTE — TELEPHONE ENCOUNTER
Patient calling to report that she was seen in the Urgent Care in [place] today and given prescription for Cipro to treat her kidney infection. Patient states she was reading and read that Cipro can cause ruptured tendons and she is running a triathlon in 3 weeks. Patient states that her sister had a ruptured tendon after taking Cipro. Patient is advised that I will have Dr Ramos, or his nurse, at Urgent Care in [place] call her back. Patient agrees with plan. Gabino at [place] [facility] is reached on the backline number, [phone], and advised of patient's concern. Camille will review this with Dr Ramos and one of the two of them will call patient back at [phone].  Reason for Disposition    Caller has URGENT medication question about med that PCP prescribed and triager unable to answer question    Additional Information    Negative: Drug overdose and nurse unable to answer question    Negative: Caller requesting information not related to medicine    Negative: Caller requesting a prescription for Strep throat and has a positive culture result    Negative: Rash while taking a medication or within 3 days of stopping it    Negative: Immunization reaction suspected    Negative: [1] Asthma and [2] having symptoms of asthma (cough, wheezing, etc)    Negative: MORE THAN A DOUBLE DOSE of a prescription or over-the-counter (OTC) drug    Negative: [1] DOUBLE DOSE (an extra dose or lesser amount) of over-the-counter (OTC) drug AND [2] any symptoms (e.g., dizziness, nausea, pain, sleepiness)    Negative: [1] DOUBLE DOSE (an extra dose or lesser amount) of prescription drug AND [2] any symptoms (e.g., dizziness, nausea, pain, sleepiness)    Negative: Took another person's prescription drug    Negative: Pharmacy calling with prescription questions and triager unable to answer question    Negative: [1] Prescription not at pharmacy AND [2] was prescribed today by PCP    Negative: [1] Request for URGENT new prescription or  "refill of \"essential\" medication (i.e., likelihood of harm to patient if not taken) AND [2] triager unable to fill per unit policy    Negative: Diabetes drug error or overdose (e.g., insulin or extra dose)    Negative: [1] DOUBLE DOSE (an extra dose or lesser amount) of prescription drug AND [2] NO symptoms (Exception: a double dose of antibiotics)    Protocols used: MEDICATION QUESTION CALL-ADULT-    "

## 2018-07-20 NOTE — PROGRESS NOTES
SUBJECTIVE:   Lou Nix is a 33 year old female presenting with a chief complaint of sharp shooting right more than left flank pain (localized, no radiation of the pain), painful urination, white clumps with urination (once in a while), nausea, body aches.  No high fevers.  No vomiting.  Patient has a h/o pyelonephritis. No h/o kidney stones.    Onset of symptoms was one day ago.  Course of illness is still present and worsening today. .    Severity mild pain but severe at times. The pain has worsened today.   Current and Associated symptoms: as listed above.   Treatment measures tried include Ibuprofen (last taken about three hours ago).  .  Predisposing factors include h/o occasional pyelonephritis. She underwent surgery as a child to correct vesico-ureteral reflux.      Past Medical History:   Diagnosis Date     ASCUS favor benign 06/2014    Co-test 3 yrs     Depressive disorder     post-partum     Headache      Microhematuria     pt reports longstanding      Vesico-ureteral reflux age 10     s/p surgery      Current Outpatient Prescriptions   Medication Sig Dispense Refill     levonorgestrel (MIRENA) 20 MCG/24HR IUD 1 each (20 mcg) by Intrauterine route once 1 each 0     multivitamin, therapeutic with minerals (MULTI-VITAMIN) TABS Take 1 tablet by mouth daily 100 tablet 3     SUMAtriptan (IMITREX) 100 MG tablet May repeat in 2 hours if needed: max 2/day; use for HA or neck pain 9 tablet 3     Social History   Substance Use Topics     Smoking status: Never Smoker     Smokeless tobacco: Never Used     Alcohol use Yes      Comment: once or twice q 3-4 months       ROS:  Review of systems negative except as stated above.    OBJECTIVE:  /70 (BP Location: Right arm, Patient Position: Chair, Cuff Size: Adult Regular)  Pulse 55  Temp 98.3  F (36.8  C) (Tympanic)  LMP 06/20/2018 (Approximate)  SpO2 98%  GENERAL APPEARANCE: healthy, alert and no distress.  Patient is sitting comfortably.   BACK:  There is  some pain with percussion over the right costovertebral angle region more than over the left side.  There was also some pain with palpation over the bilateral lumbar muscles.    SKIN: No diaphoresis.      LABS:    Results for orders placed or performed in visit on 07/20/18   UA reflex to Microscopic and Culture   Result Value Ref Range    Color Urine Yellow     Appearance Urine Clear     Glucose Urine Negative NEG^Negative mg/dL    Bilirubin Urine Negative NEG^Negative    Ketones Urine Negative NEG^Negative mg/dL    Specific Gravity Urine <=1.005 1.003 - 1.035    Blood Urine Moderate (A) NEG^Negative    pH Urine 6.5 5.0 - 7.0 pH    Protein Albumin Urine Negative NEG^Negative mg/dL    Urobilinogen Urine 0.2 0.2 - 1.0 EU/dL    Nitrite Urine Negative NEG^Negative    Leukocyte Esterase Urine Moderate (A) NEG^Negative    Source Midstream Urine    Urine Microscopic   Result Value Ref Range    WBC Urine 10-25 (A) OTO5^0 - 5 /HPF    RBC Urine 5-10 (A) OTO2^O - 2 /HPF    Squamous Epithelial /LPF Urine Few FEW^Few /LPF    Bacteria Urine Few (A) NEG^Negative /HPF         ASSESSMENT:  Flank Pain  Acute Pyelonephritis    PLAN:  Rx:  Ciprofloxacin  follow up with the primary care provider if not better in 7 days.   Go to the emergency room if there are severe lightheadedness, severe vomiting, or if there are worsening fevers.      See orders in Epic  Drink plenty of liquids.   Urine culture is pending.  Patient will be notified if there are signs of resistance of the organism to the patient's antibiotic from today.     P.S.  Patient called the nurse advisor line and the nurse advisor stated that the patient had some concerns about ciprofloxacin-related tendon rupture, since her sister had a tendon rupture while on Ciprofloxacin.  Lou Nix will be doing a triathlon.    The new treatment plan will be as follows:  Patient should return to the urgent care clinic today for a Rocephin one gram IM injection today.  Next, she should  start Cefdinir 300 mg PO BID x 10 days.   I would also fax the prescription for the Cefdinir to the patient's pharmacy.      The medical assistant phoned the patient at around 5 pm on July 20, 2018, with the new plan and the patient would like to think about this new plan.      Alexis Ramos MD

## 2018-07-20 NOTE — TELEPHONE ENCOUNTER
Called patient and gave the information listed below. States she would like to think about it and call the clinic back when she decides what she'd like to do. Liza Sandoval, [facility]

## 2018-07-20 NOTE — TELEPHONE ENCOUNTER
SUBJECTIVE:  Patient called the [facility] nurse advisor line earlier this afternoon with the concern about the potential for Ciprofloxacin-related tendon rupture.  Her sister had a tendon rupture while on Ciprofloxacin and the patient will be participating in a triathlon in about three weeks.      PLAN:  Patient should return to the [facility] to receive an intramuscular injection of Rocephin 1 gram.  She should also start a different oral antibiotic called Cefdinir 300 mg capsules.  She should take one capsule by mouth twice daily for 10 days.   I'll place the order for both the Rocephin and I will also fax the Cefdinir Rx to the patient's pharmacy.     Please notify patient of this new plan.      Alexis Ramos MD

## 2018-07-20 NOTE — MR AVS SNAPSHOT
After Visit Summary   7/20/2018    Lou Nix    MRN: [id number]           Patient Information     Date Of Birth          1985        Visit Information        Provider Department      7/20/2018 11:40 AM Alexis Ramos MD [facility]        Today's Diagnoses     Acute pyelonephritis    -  1    Flank pain          Care Instructions    Drink plenty of liquids.     Go to the emergency room if you develop severe lightheadedness, if you develop severe vomiting, or if you develop worsening fevers.      follow up with the primary care provider if not better in 7 days.                    Follow-ups after your visit        Who to contact     If you have questions or need follow up information about today's clinic visit or your schedule please contact [facility] directly at [phone].  Normal or non-critical lab and imaging results will be communicated to you by [employer], letter or phone within 4 business days after the clinic has received the results. If you do not hear from us within 7 days, please contact the clinic through [employer] or phone. If you have a critical or abnormal lab result, we will notify you by phone as soon as possible.  Submit refill requests through [employer] or call your pharmacy and they will forward the refill request to us. Please allow 3 business days for your refill to be completed.          Additional Information About Your Visit        MyChart Information     [employer] gives you secure access to your electronic health record. If you see a primary care provider, you can also send messages to your care team and make appointments. If you have questions, please call your primary care clinic.  If you do not have a primary care provider, please call [phone] and they will assist you.        Care EveryWhere ID     This is your Care EveryWhere ID. This could be used by other organizations to access your [place] medical records  [id number]         Your Vitals Were     Pulse Temperature Last Period Pulse Oximetry          55 98.3  F (36.8  C) (Tympanic) 06/20/2018 (Approximate) 98%         Blood Pressure from Last 3 Encounters:   07/20/18 114/70   07/05/18 96/64   11/10/16 102/70    Weight from Last 3 Encounters:   07/05/18 178 lb (80.7 kg)   12/14/16 189 lb (85.7 kg)   11/10/16 190 lb 8 oz (86.4 kg)              We Performed the Following     UA reflex to Microscopic and Culture     Urine Culture Aerobic Bacterial     Urine Microscopic          Today's Medication Changes          These changes are accurate as of 7/20/18  1:23 PM.  If you have any questions, ask your nurse or doctor.               Start taking these medicines.        Dose/Directions    ciprofloxacin 500 MG tablet   Commonly known as:  CIPRO   Used for:  Acute pyelonephritis        Dose:  500 mg   Take 1 tablet (500 mg) by mouth 2 times daily for 7 days   Quantity:  14 tablet   Refills:  0            Where to get your medicines      These medications were sent to [facility] [address] [place] [id number]     Phone:  [phone]     ciprofloxacin 500 MG tablet                Primary Care Provider Office Phone # Fax #    Rita Arreguin [phone] [address] [place] [address]        Equal Access to Services     [facility] AH: Hadtanisha manley Sojillian, waaxda luqadaha, qaybta kaaldany schmidt, esha tamayo . So [facility] [phone].    ATENCIÓN: Si habla español, tiene a gregory disposición servicios gratuitos de asistencia lingüística. Oseas al [phone].    We comply with applicable federal civil rights laws and [place] laws. We do not discriminate on the basis of race, color, national origin, age, disability, sex, sexual orientation, or gender identity.            Thank you!     Thank you for choosing [facility] [place]  for your care. Our goal is always to provide you with excellent care. Hearing back from our patients is one way we can continue to improve our services. Please take a few minutes to complete the written survey that you may receive in the mail after your visit with us. Thank you!             Your Updated Medication List - Protect others around you: Learn how to safely use, store and throw away your medicines at www.disposemymeds.org.          This list is accurate as of 7/20/18  1:23 PM.  Always use your most recent med list.                   Brand Name Dispense Instructions for use Diagnosis    ciprofloxacin 500 MG tablet    CIPRO    14 tablet    Take 1 tablet (500 mg) by mouth 2 times daily for 7 days    Acute pyelonephritis       levonorgestrel 20 MCG/24HR IUD    MIRENA    1 each    1 each (20 mcg) by Intrauterine route once    Routine general medical examination at a health care facility       Multi-vitamin Tabs tablet     100 tablet    Take 1 tablet by mouth daily    Routine general medical examination at a health care facility       SUMAtriptan 100 MG tablet    IMITREX    9 tablet    May repeat in 2 hours if needed: max 2/day; use for HA or neck pain    Migraine without aura and without status migrainosus, not intractable

## 2018-07-23 LAB
BACTERIA SPEC CULT: ABNORMAL
BACTERIA SPEC CULT: ABNORMAL
SPECIMEN SOURCE: ABNORMAL

## 2018-08-01 ENCOUNTER — MYC MEDICAL ADVICE (OUTPATIENT)
Dept: PEDIATRICS | Facility: CLINIC | Age: 33
End: 2018-08-01

## 2018-08-01 DIAGNOSIS — Z87.440 H/O URINARY TRACT INFECTION: Primary | ICD-10-CM

## 2018-08-03 ENCOUNTER — ALLIED HEALTH/NURSE VISIT (OUTPATIENT)
Dept: NURSING | Facility: CLINIC | Age: 33
End: 2018-08-03
Payer: COMMERCIAL

## 2018-08-03 DIAGNOSIS — Z23 NEED FOR VACCINATION: Primary | ICD-10-CM

## 2018-08-03 DIAGNOSIS — Z87.440 H/O URINARY TRACT INFECTION: ICD-10-CM

## 2018-08-03 DIAGNOSIS — R79.89 ELEVATED LIVER FUNCTION TESTS: ICD-10-CM

## 2018-08-03 DIAGNOSIS — R31.29 MICROSCOPIC HEMATURIA: Primary | ICD-10-CM

## 2018-08-03 LAB
ALBUMIN SERPL-MCNC: 3.9 G/DL (ref 3.4–5)
ALBUMIN UR-MCNC: NEGATIVE MG/DL
ALP SERPL-CCNC: 49 U/L (ref 40–150)
ALT SERPL W P-5'-P-CCNC: 33 U/L (ref 0–50)
APPEARANCE UR: ABNORMAL
AST SERPL W P-5'-P-CCNC: 22 U/L (ref 0–45)
BACTERIA #/AREA URNS HPF: ABNORMAL /HPF
BILIRUB DIRECT SERPL-MCNC: 0.2 MG/DL (ref 0–0.2)
BILIRUB SERPL-MCNC: 1 MG/DL (ref 0.2–1.3)
BILIRUB UR QL STRIP: NEGATIVE
COLOR UR AUTO: YELLOW
GLUCOSE UR STRIP-MCNC: NEGATIVE MG/DL
HGB UR QL STRIP: ABNORMAL
KETONES UR STRIP-MCNC: NEGATIVE MG/DL
LEUKOCYTE ESTERASE UR QL STRIP: ABNORMAL
NITRATE UR QL: NEGATIVE
NON-SQ EPI CELLS #/AREA URNS LPF: ABNORMAL /LPF
PH UR STRIP: 6 PH (ref 5–7)
PROT SERPL-MCNC: 6.8 G/DL (ref 6.8–8.8)
RBC #/AREA URNS AUTO: ABNORMAL /HPF
SOURCE: ABNORMAL
SP GR UR STRIP: 1.01 (ref 1–1.03)
UROBILINOGEN UR STRIP-ACNC: 0.2 EU/DL (ref 0.2–1)
WBC #/AREA URNS AUTO: ABNORMAL /HPF

## 2018-08-03 PROCEDURE — 90746 HEPB VACCINE 3 DOSE ADULT IM: CPT

## 2018-08-03 PROCEDURE — 80076 HEPATIC FUNCTION PANEL: CPT | Performed by: PEDIATRICS

## 2018-08-03 PROCEDURE — 36415 COLL VENOUS BLD VENIPUNCTURE: CPT | Performed by: PEDIATRICS

## 2018-08-03 PROCEDURE — 81001 URINALYSIS AUTO W/SCOPE: CPT | Performed by: PEDIATRICS

## 2018-08-03 PROCEDURE — 90471 IMMUNIZATION ADMIN: CPT

## 2018-08-03 PROCEDURE — 87086 URINE CULTURE/COLONY COUNT: CPT | Performed by: PEDIATRICS

## 2018-08-05 LAB
BACTERIA SPEC CULT: NORMAL
SPECIMEN SOURCE: NORMAL

## 2018-10-08 ENCOUNTER — MYC MEDICAL ADVICE (OUTPATIENT)
Dept: PEDIATRICS | Facility: CLINIC | Age: 33
End: 2018-10-08

## 2018-10-08 DIAGNOSIS — N30.01 ACUTE CYSTITIS WITH HEMATURIA: ICD-10-CM

## 2018-10-08 DIAGNOSIS — R82.90 NONSPECIFIC FINDING ON EXAMINATION OF URINE: Primary | ICD-10-CM

## 2018-10-08 DIAGNOSIS — R31.29 MICROSCOPIC HEMATURIA: ICD-10-CM

## 2018-10-08 LAB
ALBUMIN UR-MCNC: NEGATIVE MG/DL
APPEARANCE UR: CLEAR
BACTERIA #/AREA URNS HPF: ABNORMAL /HPF
BILIRUB UR QL STRIP: NEGATIVE
COLOR UR AUTO: YELLOW
GLUCOSE UR STRIP-MCNC: NEGATIVE MG/DL
HGB UR QL STRIP: ABNORMAL
KETONES UR STRIP-MCNC: NEGATIVE MG/DL
LEUKOCYTE ESTERASE UR QL STRIP: ABNORMAL
NITRATE UR QL: NEGATIVE
PH UR STRIP: 5.5 PH (ref 5–7)
RBC #/AREA URNS AUTO: ABNORMAL /HPF
SOURCE: ABNORMAL
SP GR UR STRIP: 1.01 (ref 1–1.03)
UROBILINOGEN UR STRIP-ACNC: 0.2 EU/DL (ref 0.2–1)
WBC #/AREA URNS AUTO: ABNORMAL /HPF

## 2018-10-08 PROCEDURE — 81001 URINALYSIS AUTO W/SCOPE: CPT | Performed by: PEDIATRICS

## 2018-10-08 PROCEDURE — 87086 URINE CULTURE/COLONY COUNT: CPT | Performed by: PEDIATRICS

## 2018-10-08 RX ORDER — CIPROFLOXACIN 250 MG/1
250 TABLET, FILM COATED ORAL 2 TIMES DAILY
Qty: 6 TABLET | Refills: 0 | Status: SHIPPED | OUTPATIENT
Start: 2018-10-08 | End: 2019-12-31

## 2018-10-09 LAB
BACTERIA SPEC CULT: NORMAL
SPECIMEN SOURCE: NORMAL

## 2019-10-28 ENCOUNTER — HEALTH MAINTENANCE LETTER (OUTPATIENT)
Age: 34
End: 2019-10-28

## 2019-12-31 ENCOUNTER — OFFICE VISIT (OUTPATIENT)
Dept: PEDIATRICS | Facility: CLINIC | Age: 34
End: 2019-12-31
Payer: COMMERCIAL

## 2019-12-31 VITALS
DIASTOLIC BLOOD PRESSURE: 60 MMHG | TEMPERATURE: 97.2 F | SYSTOLIC BLOOD PRESSURE: 98 MMHG | RESPIRATION RATE: 20 BRPM | BODY MASS INDEX: 31.47 KG/M2 | HEART RATE: 79 BPM | WEIGHT: 195.8 LBS | OXYGEN SATURATION: 99 % | HEIGHT: 66 IN

## 2019-12-31 DIAGNOSIS — G43.009 MIGRAINE WITHOUT AURA AND WITHOUT STATUS MIGRAINOSUS, NOT INTRACTABLE: ICD-10-CM

## 2019-12-31 DIAGNOSIS — Z97.5 IUD (INTRAUTERINE DEVICE) IN PLACE: ICD-10-CM

## 2019-12-31 DIAGNOSIS — Z12.4 CERVICAL CANCER SCREENING: ICD-10-CM

## 2019-12-31 DIAGNOSIS — Z00.00 ROUTINE GENERAL MEDICAL EXAMINATION AT A HEALTH CARE FACILITY: Primary | ICD-10-CM

## 2019-12-31 DIAGNOSIS — F33.8 SEASONAL AFFECTIVE DISORDER (H): ICD-10-CM

## 2019-12-31 DIAGNOSIS — Z23 NEED FOR HEPATITIS A AND B VACCINATION: ICD-10-CM

## 2019-12-31 DIAGNOSIS — D22.9 MULTIPLE PIGMENTED NEVI: ICD-10-CM

## 2019-12-31 DIAGNOSIS — Q62.7 CONGENITAL VESICO-URETERIC REFLUX: ICD-10-CM

## 2019-12-31 PROCEDURE — 90471 IMMUNIZATION ADMIN: CPT | Performed by: PEDIATRICS

## 2019-12-31 PROCEDURE — 87624 HPV HI-RISK TYP POOLED RSLT: CPT | Performed by: PEDIATRICS

## 2019-12-31 PROCEDURE — 99395 PREV VISIT EST AGE 18-39: CPT | Mod: 25 | Performed by: PEDIATRICS

## 2019-12-31 PROCEDURE — 90636 HEP A/HEP B VACC ADULT IM: CPT | Performed by: PEDIATRICS

## 2019-12-31 PROCEDURE — G0145 SCR C/V CYTO,THINLAYER,RESCR: HCPCS | Performed by: PEDIATRICS

## 2019-12-31 RX ORDER — SUMATRIPTAN 100 MG/1
TABLET, FILM COATED ORAL
Qty: 9 TABLET | Refills: 3 | Status: SHIPPED | OUTPATIENT
Start: 2019-12-31 | End: 2020-12-16

## 2019-12-31 SDOH — HEALTH STABILITY: PHYSICAL HEALTH: ON AVERAGE, HOW MANY DAYS PER WEEK DO YOU ENGAGE IN MODERATE TO STRENUOUS EXERCISE (LIKE A BRISK WALK)?: 3 DAYS

## 2019-12-31 SDOH — SOCIAL STABILITY: SOCIAL INSECURITY: ARE YOU MARRIED, WIDOWED, DIVORCED, SEPARATED, NEVER MARRIED, OR LIVING WITH A PARTNER?: MARRIED

## 2019-12-31 SDOH — SOCIAL STABILITY: SOCIAL NETWORK: HOW OFTEN DO YOU ATTEND MEETINGS OF THE CLUBS OR ORGANIZATIONS YOU BELONG TO?: MORE THAN 4 TIMES PER YEAR

## 2019-12-31 SDOH — SOCIAL STABILITY: SOCIAL NETWORK: HOW OFTEN DO YOU ATTEND CHURCH OR RELIGIOUS SERVICES?: 1 TO 4 TIMES PER YEAR

## 2019-12-31 SDOH — HEALTH STABILITY: PHYSICAL HEALTH: ON AVERAGE, HOW MANY MINUTES DO YOU ENGAGE IN EXERCISE AT THIS LEVEL?: 40 MIN

## 2019-12-31 SDOH — HEALTH STABILITY: MENTAL HEALTH: HOW OFTEN DO YOU HAVE A DRINK CONTAINING ALCOHOL?: MONTHLY OR LESS

## 2019-12-31 SDOH — ECONOMIC STABILITY: FOOD INSECURITY: HOW HARD IS IT FOR YOU TO PAY FOR THE VERY BASICS LIKE FOOD, HOUSING, MEDICAL CARE, AND HEATING?: NOT VERY HARD

## 2019-12-31 SDOH — ECONOMIC STABILITY: FOOD INSECURITY: WITHIN THE PAST 12 MONTHS, YOU WORRIED THAT YOUR FOOD WOULD RUN OUT BEFORE YOU GOT THE MONEY TO BUY MORE.: NEVER TRUE

## 2019-12-31 SDOH — SOCIAL STABILITY: SOCIAL NETWORK: HOW OFTEN DO YOU GET TOGETHER WITH FRIENDS OR RELATIVES?: TWICE A WEEK

## 2019-12-31 SDOH — HEALTH STABILITY: MENTAL HEALTH
DO YOU FEEL STRESS - TENSE, RESTLESS, NERVOUS, OR ANXIOUS, OR UNABLE TO SLEEP AT NIGHT BECAUSE YOUR MIND IS TROUBLED ALL THE TIME - THESE DAYS?: ONLY A LITTLE

## 2019-12-31 SDOH — SOCIAL STABILITY: SOCIAL NETWORK
DO YOU BELONG TO ANY CLUBS OR ORGANIZATIONS SUCH AS CHURCH GROUPS, UNIONS, FRATERNAL OR ATHLETIC GROUPS, OR SCHOOL GROUPS?: YES

## 2019-12-31 SDOH — ECONOMIC STABILITY: TRANSPORTATION INSECURITY: IN THE PAST 12 MONTHS, HAS LACK OF TRANSPORTATION KEPT YOU FROM MEDICAL APPOINTMENTS OR FROM GETTING MEDICATIONS?: NO

## 2019-12-31 SDOH — SOCIAL STABILITY: SOCIAL NETWORK
IN A TYPICAL WEEK, HOW MANY TIMES DO YOU TALK ON THE PHONE WITH FAMILY, FRIENDS, OR NEIGHBORS?: MORE THAN THREE TIMES A WEEK

## 2019-12-31 SDOH — HEALTH STABILITY: MENTAL HEALTH: HOW MANY DRINKS CONTAINING ALCOHOL DO YOU HAVE ON A TYPICAL DAY WHEN YOU ARE DRINKING?: 1 OR 2

## 2019-12-31 SDOH — ECONOMIC STABILITY: FOOD INSECURITY: WITHIN THE PAST 12 MONTHS, THE FOOD YOU BOUGHT JUST DIDN'T LAST AND YOU DIDN'T HAVE MONEY TO GET MORE.: NEVER TRUE

## 2019-12-31 SDOH — HEALTH STABILITY: MENTAL HEALTH: HOW OFTEN DO YOU HAVE SIX OR MORE DRINKS ON ONE OCCASION?: NEVER

## 2019-12-31 ASSESSMENT — ENCOUNTER SYMPTOMS
NAUSEA: 0
SHORTNESS OF BREATH: 0
DIARRHEA: 0
CHILLS: 0
HEMATOCHEZIA: 0
HEARTBURN: 0
HEADACHES: 0
EYE PAIN: 0
SORE THROAT: 0
CONSTIPATION: 0
COUGH: 0
JOINT SWELLING: 0
FREQUENCY: 0
ABDOMINAL PAIN: 0
WEAKNESS: 0
PALPITATIONS: 0
HEMATURIA: 0
BREAST MASS: 0
DYSURIA: 0
PARESTHESIAS: 0
DIZZINESS: 0
ARTHRALGIAS: 0
FEVER: 0
NERVOUS/ANXIOUS: 0
MYALGIAS: 0

## 2019-12-31 ASSESSMENT — PATIENT HEALTH QUESTIONNAIRE - PHQ9
SUM OF ALL RESPONSES TO PHQ QUESTIONS 1-9: 3
SUM OF ALL RESPONSES TO PHQ QUESTIONS 1-9: 3
10. IF YOU CHECKED OFF ANY PROBLEMS, HOW DIFFICULT HAVE THESE PROBLEMS MADE IT FOR YOU TO DO YOUR WORK, TAKE CARE OF THINGS AT HOME, OR GET ALONG WITH OTHER PEOPLE: NOT DIFFICULT AT ALL

## 2019-12-31 ASSESSMENT — MIFFLIN-ST. JEOR: SCORE: 1606.95

## 2019-12-31 ASSESSMENT — ACTIVITIES OF DAILY LIVING (ADL): LACK_OF_TRANSPORTATION: NO

## 2019-12-31 NOTE — PROGRESS NOTES
SUBJECTIVE:   CC: Lou Nix is an 34 year old woman who presents for preventive health visit.     Healthy Habits:     Getting at least 3 servings of Calcium per day:  Yes    Bi-annual eye exam:  Yes    Dental care twice a year:  Yes    Sleep apnea or symptoms of sleep apnea:  None    Diet:  Regular (no restrictions)    Frequency of exercise:  4-5 days/week    Duration of exercise:  30-45 minutes    Taking medications regularly:  Not Applicable    Medication side effects:  Not applicable    PHQ-2 Total Score: 0    Additional concerns today:  No      Today's PHQ-2 Score:   PHQ-2 ( 1999 Pfizer) 12/31/2019   Q1: Little interest or pleasure in doing things 0   Q2: Feeling down, depressed or hopeless 0   PHQ-2 Score 0   Q1: Little interest or pleasure in doing things Not at all   Q2: Feeling down, depressed or hopeless Not at all   PHQ-2 Score 0     Abuse: Current or Past(Physical, Sexual or Emotional)- No  Do you feel safe in your environment? Yes    Social History     Tobacco Use     Smoking status: Never Smoker     Smokeless tobacco: Never Used   Substance Use Topics     Alcohol use: Yes     Frequency: Monthly or less     Drinks per session: 1 or 2     Binge frequency: Never     Comment: once or twice q 3-4 months     Alcohol Use 12/31/2019   Prescreen: >3 drinks/day or >7 drinks/week? No   Prescreen: >3 drinks/day or >7 drinks/week? -     Reviewed orders with patient.  Reviewed health maintenance and updated orders accordingly - Yes    Mammogram not appropriate for this patient based on age.    Pertinent mammograms are reviewed under the imaging tab.  History of abnormal Pap smear: NO - age 30- 65 PAP every 3 years recommended  PAP / HPV Latest Ref Rng & Units 11/17/2016 11/10/2016 6/4/2014   PAP - - NIL ASC-US(A)   HPV 16 DNA NEG Negative - -   HPV 18 DNA NEG Negative - -   OTHER HR HPV NEG Negative - -     Reviewed and updated as needed this visit by clinical staff  Tobacco  Allergies  Med Hx  Surg Hx   "Fam Hx  Soc Hx        Reviewed and updated as needed this visit by Provider          Depression/anxiety/SAD - doing well on vitamin D and mood has been in a good place.  Has kept up exercise for the winter and this has been helpful.  Working with a .      Congenital vesico-ureteric reflux - last seen by urology in 2017 with normal CT and cystoscopy, plan follow up prn    IUD in place - 2018 replaced - doesn't really get periods anymore - occasional spotting    Stress incontinence - has been working on core strength with  and this has helped.    Moles - no changes, great with sunscreen    Migraines - overall, well controlled.  Last used imitrex 2 years ago!  No new neurologic symptoms.      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   HENT: Negative for congestion, ear pain, hearing loss and sore throat.    Eyes: Negative for pain and visual disturbance.   Respiratory: Negative for cough and shortness of breath.    Cardiovascular: Negative for chest pain, palpitations and peripheral edema.   Gastrointestinal: Negative for abdominal pain, constipation, diarrhea, heartburn, hematochezia and nausea.   Breasts:  Negative for tenderness, breast mass and discharge.   Genitourinary: Negative for dysuria, frequency, genital sores, hematuria, pelvic pain, urgency, vaginal bleeding and vaginal discharge.   Musculoskeletal: Negative for arthralgias, joint swelling and myalgias.   Skin: Negative for rash.   Neurological: Negative for dizziness, weakness, headaches and paresthesias.   Psychiatric/Behavioral: Negative for mood changes. The patient is not nervous/anxious.           OBJECTIVE:   BP 98/60 (BP Location: Right arm, Patient Position: Sitting, Cuff Size: Adult Large)   Pulse 79   Temp 97.2  F (36.2  C) (Tympanic)   Resp 20   Ht 1.664 m (5' 5.5\")   Wt 88.8 kg (195 lb 12.8 oz)   SpO2 99%  "  BMI 32.09 kg/m     Wt Readings from Last 4 Encounters:   12/31/19 88.8 kg (195 lb 12.8 oz)   07/05/18 80.7 kg (178 lb)   12/14/16 85.7 kg (189 lb)   11/10/16 86.4 kg (190 lb 8 oz)       Physical Exam  GENERAL: healthy, alert and no distress  EYES: Eyes grossly normal to inspection, PERRL and conjunctivae and sclerae normal  HENT: ear canals and TM's normal, nose and mouth without ulcers or lesions  NECK: no adenopathy, no asymmetry, masses, or scars and thyroid normal to palpation  RESP: lungs clear to auscultation - no rales, rhonchi or wheezes  BREAST: normal without masses, tenderness or nipple discharge and no palpable axillary masses or adenopathy  CV: regular rate and rhythm, normal S1 S2, no S3 or S4, no murmur, click or rub, no peripheral edema and peripheral pulses strong  ABDOMEN: soft, nontender, no hepatosplenomegaly, no masses and bowel sounds normal   (female): normal female external genitalia, normal urethral meatus, vaginal mucosa pink, moist, well rugated, and normal cervix/adnexa/uterus without masses or discharge  MS: no gross musculoskeletal defects noted, no edema  SKIN: no suspicious lesions or rashes  NEURO: Normal strength and tone, mentation intact and speech normal  PSYCH: mentation appears normal, affect normal/bright    Diagnostic Test Results:  pending    ASSESSMENT/PLAN:       ICD-10-CM    1. Routine general medical examination at a health care facility Z00.00 COMPREHENSIVE METABOLIC PANEL     Lipid panel reflex to direct LDL Fasting   2. Migraine without aura and without status migrainosus, not intractable G43.009 SUMAtriptan (IMITREX) 100 MG tablet   3. Seasonal affective disorder (H) F33.8 Doing well with exercise and vitamin D   4. Congenital vesico-ureteric reflux Q62.7 Follow for UTIs   5. 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 Cervical cancer screening Z12.4 HPV High Risk Types DNA Cervical     Pap imaged thin layer screen with HPV -  "recommended age 30 - 65 years (select HPV order below)   7. IUD (intrauterine device) in place Z97.5    8. Need for hepatitis A and B vaccination Z23 HEPA/HEPB VACCINE ADULT IM       COUNSELING:  Special attention given to:        Regular exercise       Healthy diet/nutrition       Vision screening       Contraception    Estimated body mass index is 32.09 kg/m  as calculated from the following:    Height as of this encounter: 1.664 m (5' 5.5\").    Weight as of this encounter: 88.8 kg (195 lb 12.8 oz).    Weight management plan: Discussed healthy diet and exercise guidelines     reports that she has never smoked. She has never used smokeless tobacco.      Counseling Resources:  ATP IV Guidelines  Pooled Cohorts Equation Calculator  Breast Cancer Risk Calculator  FRAX Risk Assessment  ICSI Preventive Guidelines  Dietary Guidelines for Americans, 2010  USDA's MyPlate  ASA Prophylaxis  Lung CA Screening    Rita Arreguin MD  [facility] [place]  Answers for HPI/ROS submitted by the patient on 12/31/2019   Annual Exam:  If you checked off any problems, how difficult have these problems made it for you to do your work, take care of things at home, or get along with other people?: Not difficult at all  PHQ9 TOTAL SCORE: 3    "

## 2020-01-01 ASSESSMENT — PATIENT HEALTH QUESTIONNAIRE - PHQ9: SUM OF ALL RESPONSES TO PHQ QUESTIONS 1-9: 3

## 2020-01-03 LAB
COPATH REPORT: NORMAL
PAP: NORMAL

## 2020-01-06 LAB
FINAL DIAGNOSIS: NORMAL
HPV HR 12 DNA CVX QL NAA+PROBE: NEGATIVE
HPV16 DNA SPEC QL NAA+PROBE: NEGATIVE
HPV18 DNA SPEC QL NAA+PROBE: NEGATIVE
SPECIMEN DESCRIPTION: NORMAL
SPECIMEN SOURCE CVX/VAG CYTO: NORMAL

## 2020-02-07 ENCOUNTER — VIRTUAL VISIT (OUTPATIENT)
Dept: FAMILY MEDICINE | Facility: OTHER | Age: 35
End: 2020-02-07

## 2020-02-07 NOTE — PROGRESS NOTES
"Date: 2020 08:05:58  Clinician: Iris Hanna  Clinician NPI: [id number]  Patient: Lou ohara  Patient : 1985  Patient Address: [id number] [place], [place] [address] [id number]  Patient Phone: [phone]  Visit Protocol: UTI  Patient Summary:  Lou is a 34 year old ( : 1985 ) female who initiated a Visit for a presumed bladder infection. When asked the question \"Please sign me up to receive news, health information and promotions from [employer].\", Lou responded \"Yes\".   Her symptoms started 1-3 days ago and consist of dysuria, foul-smelling urine, nausea, feeling as if the bladder is never empty, abdominal pain, urinary frequency, urgency, and urinary incontinence.   Symptom details     Urine color: The color of her urine is orange.     Abdominal pain: The pain is mild (1-3 on a 10 point pain scale).      Denied symptoms include flank pain, chills, vaginal discharge, vomiting, and vaginal itching. She does not feel feverish.   Lou has not used any over-the-counter medications or home remedies to relieve her current symptoms.  Precipitating events  Lou denies having a sexually transmitted disease.  Pertinent medical history  Lou has had a bladder infection before but has not had any in the past 12 months. Her current symptoms are similar to her previous bladder infection symptoms.   Sulfamethoxazole-trimethoprim (Bactrim DS) and ciprofloxacin (Cipro) has been effective in treating her past bladder infections.   Lou typically gets a yeast infection when she takes antibiotics. She has used fluconazole (Diflucan) to treat previous yeast infections. 2 doses of fluconazole (Diflucan) has typically been needed for symptoms to resolve in the past.  Lou has not been prescribed antibiotics to prevent frequent or repeated bladder infections in the past. She has not experienced problems or side effects with any of the common antibiotics used to treat bladder infections.   Lou " does not have a history of kidney stones. She has not used a catheter or been a patient in a hospital or nursing home in the past 2 weeks.   Lou does not smoke or use smokeless tobacco.   She denies pregnancy and denies breastfeeding. She has menstruated in the past month.     MEDICATIONS: No current medications, ALLERGIES: NKDA  Clinician Response:  Dear Lou,  Based on the information you have provided, you likely have an acute urinary tract infection, also called a bladder infection. Bladder infections occur when bacteria from the outside of the body enters the urinary tract. Any part of the urinary system can be infected, but the bladder is the most common.  Medication information  I am prescribing:     Sulfamethoxazole-trimethoprim (Bactrim DS) 800-160 mg oral tablet. Take 1 tablet by mouth every 12 hours for 3 days. There are no refills with this prescription.   Certain antibiotics such as Bactrim and Ciprofloxacin can cause your skin to be more sensitive to the sun. Exposure to the sun when taking these antibiotics may cause skin rash, itching, redness, or a severe sunburn. Be sure to avoid prolonged or direct exposure to the sun, especially between 10 am and 3 pm, if possible. Wear protective clothing and use sunscreen of SPF 30 or higher when you are outdoors.  The medication I prescribed for your bladder infection is an antibiotic. Continue taking the medication until it is gone even if you feel better. If you get an upset stomach while taking antibiotics, taking the medication with food can help.   Yeast infections can be a common side effect of antibiotics. The most common symptom of a yeast infection is itchiness in and around the vagina. Other signs and symptoms include burning, redness, or a thick, white vaginal discharge that looks like cottage cheese and does not have a bad smell.  Self care  Urination helps to flush bacteria from the urinary tract. For this reason, drinking water and  urinating often helps relieve some urinary symptoms and can decrease your risk of getting bladder infections in the future.  Other steps you can take to prevent future bladder infections include:     Wipe front to back after using the bathroom    Urinate after sexual intercourse    Avoid using deodorant sprays, douches, or powders in the vaginal area     When to seek care  Please make an appointment to be seen in a clinic or urgent care if any of the following occur:     You develop new symptoms or your symptoms become worse    You have medication side effects that make it difficult to take them as prescribed    Your symptoms do not improve within 1-2 days of starting treatment    You have symptoms of a bladder infection that return shortly after completing treatment     It is possible to have an allergic reaction to an antibiotic even if you have not had one in the past. If you notice a new rash, significant swelling, or difficulty breathing, stop taking this medication immediately and go to a clinic or urgent care.   Diagnosis: Acute uncomplicated bladder infection  Diagnosis ICD: N39.0  Prescription: sulfamethoxazole-trimethoprim (Bactrim DS) 800-160 mg oral tablet 6 tablet, 3 days supply. Take 1 tablet by mouth every 12 hours for 3 days. Refills: 0, Refill as needed: no, Allow substitutions: yes  Pharmacy: [id number] - [phone] - [address] [place] [address]  Addendum created: February 07 08:28:17, 2020 created by: Iris Hanna body: Please add two doses of fluconazole 150 mg to be taken five days apart to the patient's chart. No refills, dispense two tablets, generic ok.  Thank you

## 2020-03-30 ENCOUNTER — MYC MEDICAL ADVICE (OUTPATIENT)
Dept: PEDIATRICS | Facility: CLINIC | Age: 35
End: 2020-03-30

## 2020-05-03 ENCOUNTER — VIRTUAL VISIT (OUTPATIENT)
Dept: FAMILY MEDICINE | Facility: OTHER | Age: 35
End: 2020-05-03

## 2020-05-03 NOTE — PROGRESS NOTES
"Date: 2020 11:06:48  Clinician: Pily Ley  Clinician NPI: [id number]  Patient: Lou ohara  Patient : 1985  Patient Address: [address] [place] [address] [place] [id number]  Patient Phone: [phone]  Visit Protocol: URI  Patient Summary:  Lou is a 34 year old ( : 1985 ) female who initiated a Visit for COVID-19 (Coronavirus) evaluation and screening. When asked the question \"Please sign me up to receive news, health information and promotions from [employer].\", Lou responded \"No\".    Lou states her symptoms started gradually 5-6 days ago. After her symptoms started, they improved and then got worse again.   Her symptoms consist of a sore throat, a cough, ageusia, diarrhea, a headache, malaise, and wheezing. She is experiencing mild difficulty breathing with activities but can speak normally in full sentences.   Symptom details     Cough: Lou coughs every 5-10 minutes and her cough is not more bothersome at night. Phlegm does not come into her throat when she coughs. She does not believe her cough is caused by post-nasal drip.     Sore throat: Lou reports having mild throat pain (1-3 on a 10 point pain scale), does not have exudate on her tonsils, and can swallow liquids. The lymph nodes in her neck are not enlarged. A rash has not appeared on the skin since the sore throat started.     Wheezing: Lou has not ever been diagnosed with asthma. The wheezing does not interfere with her normal daily activities.    Headache: She states the headache is mild (1-3 on a 10 point pain scale).      Lou denies having fever, myalgias, rhinitis, facial pain or pressure, nasal congestion, vomiting, nausea, teeth pain, anosmia, ear pain, enlarged lymph nodes, and chills. She also denies taking antibiotic medication for the symptoms and having recent facial or sinus surgery in the past 60 days.   Precipitating events  Within the past week, oLu has not been exposed to someone with " strep throat. She has not recently been exposed to someone with influenza. Lou has not been in close contact with any high risk individuals.   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   Lou has not had a close contact with a laboratory-confirmed COVID-19 patient within 14 days of symptom onset. She also has not had a close contact with a suspected COVID-19 patient within 14 days of symptom onset.   Pertinent medical history  Lou had 1 sinus infection within the past year.   Lou typically gets a yeast infection when she takes antibiotics. She has used fluconazole (Diflucan) to treat previous yeast infections. 2 doses of fluconazole (Diflucan) has typically been needed for symptoms to resolve in the past.  Lou needs a return to work/school note.   Weight: 197 lbs   Lou does not smoke or use smokeless tobacco.   She denies pregnancy and denies breastfeeding. She has menstruated in the past month.   Weight: 197 lbs  A synchronous phone visit was initiated by the provider for the following reason: Wheezing    MEDICATIONS: No current medications, ALLERGIES: NKDA  Clinician Response:  Dear Lou,   Your symptoms show that you may have coronavirus (COVID-19). This illness can cause fever, cough and trouble breathing. Many people get a mild case and get better on their own. Some people can get very sick.   Will I be tested for COVID-19?  We would recommend you be tested for coronavirus. Here is how to get that scheduled:   Call [phone]. Tell them you were referred by [facility] to have a COVID-19 test. You will be scheduled at one of our [facility] testing locations (drive-up). Please have your [facility] visit  information ready when you call including your visit ID number to verify you were referred.    How can I protect others in the meantime?  First, stay home and away from others (self-isolate) until:   You've had no fever---and no medicine that reduces fever---for 3 full days (72 hours). And...    Your other symptoms have gotten better. For example, your cough or breathing has improved. And...    At least 7 days have passed since your symptoms started.   During this time:   Don't go to work, school or anywhere else.     Stay away from others in your home. No hugging, kissing or shaking hands.    Don't let anyone visit.    Cover your mouth and nose with a mask, tissue or wash cloth to avoid spreading germs.    Wash your hands and face often. Use soap and water.   How can I take care of myself?  1.Take Tylenol (acetaminophen) for fever or pain. If you have liver or kidney problems, ask your family doctor if it's okay to take Tylenol.   Adults can take either:    650 mg (two 325 mg pills) every 4 to 6 hours, or...    1,000 mg (two 500 mg pills) every 8 hours as needed.     Note: Don't take more than 3,000 mg in one day.   For children, check the Tylenol bottle for the right dose. The dose is based on the child's age or weight.  2.If you have other health problems (like cancer, heart failure, an organ transplant or severe kidney disease): Call your specialty clinic if you don't feel better in the next 2 days.  3.Know when to call 911: If your breathing is so bad that it keeps you from doing normal activities, call 911 or go to the emergency room. Tell them that you've been staying home and may have COVID-19.  4.Sign up for [employer]. We know it's scary to hear that you might have COVID-19. We want to track your symptoms to make sure you're okay over the next 2 weeks. Please look for an email from [employer]---this is a free, online program that we'll use to keep in touch. To sign up, follow the link in the email.  Learn more at [email or web address].  Where can I get more information?  To learn more about COVID-19 and how to care for yourself at home, please visit the CDC website at https://www.cdc.gov/coronavirus/2019-ncov/about/steps-when-sick.html.  For more about your care at [facility], please visit [email or web address]     Diagnosis: Cough  Diagnosis ICD: R05  Triage Notes: I reviewed the patient's history, verified their identity, and explained the Visit process.    34 year old female patient complains of sore throat, cough, diarrhea, headache, malaise for the last 5 to 6 days and the wheezing started today without shortness of breath and mild difficulty breathing without a history of asthma.  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 Patient did contact her employee health and was told to be checked for the coronavirus.  Synchronous Triage: phone, status: completed, duration: 229 seconds

## 2020-07-17 ENCOUNTER — TRANSFERRED RECORDS (OUTPATIENT)
Dept: HEALTH INFORMATION MANAGEMENT | Facility: CLINIC | Age: 35
End: 2020-07-17

## 2020-11-13 ENCOUNTER — TRANSFERRED RECORDS (OUTPATIENT)
Dept: HEALTH INFORMATION MANAGEMENT | Facility: CLINIC | Age: 35
End: 2020-11-13

## 2020-12-15 ASSESSMENT — ENCOUNTER SYMPTOMS
NERVOUS/ANXIOUS: 0
CONSTIPATION: 0
HEADACHES: 0
COUGH: 0
NAUSEA: 0
JOINT SWELLING: 0
HEMATURIA: 0
FEVER: 0
SORE THROAT: 0
PARESTHESIAS: 0
FREQUENCY: 0
MYALGIAS: 0
HEARTBURN: 0
ABDOMINAL PAIN: 0
DYSURIA: 0
HEMATOCHEZIA: 0
PALPITATIONS: 0
EYE PAIN: 0
SHORTNESS OF BREATH: 0
CHILLS: 0
BREAST MASS: 0
ARTHRALGIAS: 0
DIZZINESS: 0
WEAKNESS: 0
DIARRHEA: 0

## 2020-12-16 ENCOUNTER — OFFICE VISIT (OUTPATIENT)
Dept: PEDIATRICS | Facility: CLINIC | Age: 35
End: 2020-12-16
Payer: COMMERCIAL

## 2020-12-16 VITALS
BODY MASS INDEX: 30.82 KG/M2 | TEMPERATURE: 98.3 F | SYSTOLIC BLOOD PRESSURE: 120 MMHG | RESPIRATION RATE: 26 BRPM | DIASTOLIC BLOOD PRESSURE: 80 MMHG | HEIGHT: 66 IN | OXYGEN SATURATION: 98 % | WEIGHT: 191.8 LBS | HEART RATE: 74 BPM

## 2020-12-16 DIAGNOSIS — G43.009 MIGRAINE WITHOUT AURA AND WITHOUT STATUS MIGRAINOSUS, NOT INTRACTABLE: ICD-10-CM

## 2020-12-16 DIAGNOSIS — Z97.5 IUD (INTRAUTERINE DEVICE) IN PLACE: ICD-10-CM

## 2020-12-16 DIAGNOSIS — F41.1 GAD (GENERALIZED ANXIETY DISORDER): ICD-10-CM

## 2020-12-16 DIAGNOSIS — Z00.00 ROUTINE GENERAL MEDICAL EXAMINATION AT A HEALTH CARE FACILITY: Primary | ICD-10-CM

## 2020-12-16 DIAGNOSIS — F33.8 SEASONAL AFFECTIVE DISORDER (H): ICD-10-CM

## 2020-12-16 DIAGNOSIS — N88.8 CERVICAL CYST: ICD-10-CM

## 2020-12-16 LAB
ERYTHROCYTE [DISTWIDTH] IN BLOOD BY AUTOMATED COUNT: 11.3 % (ref 10–15)
HCT VFR BLD AUTO: 40.1 % (ref 35–47)
HGB BLD-MCNC: 14.6 G/DL (ref 11.7–15.7)
MCH RBC QN AUTO: 31.5 PG (ref 26.5–33)
MCHC RBC AUTO-ENTMCNC: 36.4 G/DL (ref 31.5–36.5)
MCV RBC AUTO: 86 FL (ref 78–100)
PLATELET # BLD AUTO: 251 10E9/L (ref 150–450)
RBC # BLD AUTO: 4.64 10E12/L (ref 3.8–5.2)
WBC # BLD AUTO: 5.3 10E9/L (ref 4–11)

## 2020-12-16 PROCEDURE — 80061 LIPID PANEL: CPT | Performed by: PEDIATRICS

## 2020-12-16 PROCEDURE — 87491 CHLMYD TRACH DNA AMP PROBE: CPT | Performed by: PEDIATRICS

## 2020-12-16 PROCEDURE — 99395 PREV VISIT EST AGE 18-39: CPT | Performed by: PEDIATRICS

## 2020-12-16 PROCEDURE — 87591 N.GONORRHOEAE DNA AMP PROB: CPT | Performed by: PEDIATRICS

## 2020-12-16 PROCEDURE — 80053 COMPREHEN METABOLIC PANEL: CPT | Performed by: PEDIATRICS

## 2020-12-16 PROCEDURE — 85027 COMPLETE CBC AUTOMATED: CPT | Performed by: PEDIATRICS

## 2020-12-16 PROCEDURE — 36415 COLL VENOUS BLD VENIPUNCTURE: CPT | Performed by: PEDIATRICS

## 2020-12-16 RX ORDER — SUMATRIPTAN 100 MG/1
TABLET, FILM COATED ORAL
Qty: 18 TABLET | Refills: 11 | Status: SHIPPED | OUTPATIENT
Start: 2020-12-16 | End: 2022-04-27

## 2020-12-16 ASSESSMENT — ENCOUNTER SYMPTOMS
DIARRHEA: 0
PARESTHESIAS: 0
EYE PAIN: 0
HEARTBURN: 0
MYALGIAS: 0
SORE THROAT: 0
WEAKNESS: 0
DYSURIA: 0
JOINT SWELLING: 0
FREQUENCY: 0
CHILLS: 0
CONSTIPATION: 0
HEADACHES: 0
NERVOUS/ANXIOUS: 0
HEMATOCHEZIA: 0
NAUSEA: 0
COUGH: 0
HEMATURIA: 0
FEVER: 0
ARTHRALGIAS: 0
PALPITATIONS: 0
DIZZINESS: 0
SHORTNESS OF BREATH: 0
BREAST MASS: 0
ABDOMINAL PAIN: 0

## 2020-12-16 ASSESSMENT — MIFFLIN-ST. JEOR: SCORE: 1583.81

## 2020-12-16 ASSESSMENT — ANXIETY QUESTIONNAIRES
IF YOU CHECKED OFF ANY PROBLEMS ON THIS QUESTIONNAIRE, HOW DIFFICULT HAVE THESE PROBLEMS MADE IT FOR YOU TO DO YOUR WORK, TAKE CARE OF THINGS AT HOME, OR GET ALONG WITH OTHER PEOPLE: NOT DIFFICULT AT ALL
2. NOT BEING ABLE TO STOP OR CONTROL WORRYING: NOT AT ALL
7. FEELING AFRAID AS IF SOMETHING AWFUL MIGHT HAPPEN: NOT AT ALL
1. FEELING NERVOUS, ANXIOUS, OR ON EDGE: NOT AT ALL
6. BECOMING EASILY ANNOYED OR IRRITABLE: SEVERAL DAYS
GAD7 TOTAL SCORE: 2
5. BEING SO RESTLESS THAT IT IS HARD TO SIT STILL: SEVERAL DAYS
3. WORRYING TOO MUCH ABOUT DIFFERENT THINGS: NOT AT ALL

## 2020-12-16 ASSESSMENT — PATIENT HEALTH QUESTIONNAIRE - PHQ9
5. POOR APPETITE OR OVEREATING: NOT AT ALL
SUM OF ALL RESPONSES TO PHQ QUESTIONS 1-9: 2

## 2020-12-16 NOTE — PATIENT INSTRUCTIONS
Let's plan on colonoscopy at age 42    Keep a close eye on your mood    Labs today    Thank you for getting your flu shot!    Call for visit with your gynecologist - let me know if you have a hard time scheduling

## 2020-12-16 NOTE — PROGRESS NOTES
SUBJECTIVE:   CC: Lou Nix is an 35 year old woman who presents for preventive health visit.   Patient has been advised of split billing requirements and indicates understanding: Yes     Healthy Habits:     Getting at least 3 servings of Calcium per day:  Yes    Bi-annual eye exam:  NO    Dental care twice a year:  Yes    Sleep apnea or symptoms of sleep apnea:  None    Diet:  Carbohydrate counting and Gluten-free/reduced    Frequency of exercise:  2-3 days/week    Duration of exercise:  30-45 minutes    Taking medications regularly:  Yes    Medication side effects:  None    PHQ-2 Total Score: 0    Additional concerns today:  Yes    Patient has large family history of colon issues in family. Patient has lumps on cervix and has had some abnormal bleeding because of this.     Today's PHQ-2 Score:   PHQ-2 ( 1999 Pfizer) 12/15/2020   Q1: Little interest or pleasure in doing things 0   Q2: Feeling down, depressed or hopeless 0   PHQ-2 Score 0   Q1: Little interest or pleasure in doing things Not at all   Q2: Feeling down, depressed or hopeless Not at all   PHQ-2 Score 0       Abuse: Current or Past (Physical, Sexual or Emotional) - No  Do you feel safe in your environment? Yes      Social History     Tobacco Use     Smoking status: Never Smoker     Smokeless tobacco: Never Used   Substance Use Topics     Alcohol use: Not Currently     Frequency: Monthly or less     Drinks per session: 1 or 2     Binge frequency: Never     Comment: once or twice q 3-4 months         Alcohol Use 12/15/2020   Prescreen: >3 drinks/day or >7 drinks/week? No   Prescreen: >3 drinks/day or >7 drinks/week? -       Reviewed orders with patient.  Reviewed health maintenance and updated orders accordingly - Yes    Mammogram not appropriate for this patient based on age.    Pertinent mammograms are reviewed under the imaging tab.  History of abnormal Pap smear: NO - age 30- 65 PAP every 3 years recommended  PAP / HPV Latest Ref Rng & Units  12/31/2019 11/17/2016 11/10/2016   PAP - NIL - NIL   HPV 16 DNA NEG:Negative Negative Negative -   HPV 18 DNA NEG:Negative Negative Negative -   OTHER HR HPV NEG:Negative Negative Negative -     Reviewed and updated as needed this visit by clinical staff  Tobacco  Allergies  Meds   Med Hx  Surg Hx  Fam Hx  Soc Hx        Reviewed and updated as needed this visit by Provider                Migraines - imitrex is helpful when needed - rare migraines - just one this year    IUD - lumps on cervix that are getting larger - noticed over the last 3-4 months.  Has had some irregular bleeding and post coital bleeding.   No concerns about sexually transmitted infections.  Negative pregnancy test yesterday.    Mirena - [facility] placed, sees [facility]    Paternal side of family - many people with polyps or colon cancer.  Father had polyps on initial colonoscopy. Patient wondering about when to start screening.    Review of Systems   Constitutional: Negative for chills and fever.   HENT: Negative for congestion, ear pain, hearing loss and sore throat.    Eyes: Negative for pain and visual disturbance.   Respiratory: Negative for cough and shortness of breath.    Cardiovascular: Negative for chest pain, palpitations and peripheral edema.   Gastrointestinal: Negative for abdominal pain, constipation, diarrhea, heartburn, hematochezia and nausea.   Breasts:  Negative for tenderness, breast mass and discharge.   Genitourinary: Positive for pelvic pain and vaginal bleeding. Negative for dysuria, frequency, genital sores, hematuria, urgency and vaginal discharge.   Musculoskeletal: Negative for arthralgias, joint swelling and myalgias.   Skin: Negative for rash.   Neurological: Negative for dizziness, weakness, headaches and paresthesias.   Psychiatric/Behavioral: Negative for mood changes. The patient is not nervous/anxious.           OBJECTIVE:   /80 (BP Location: Right arm, Patient Position: Sitting, Cuff Size: Adult  "Regular)   Pulse 74   Temp 98.3  F (36.8  C) (Tympanic)   Resp 26   Ht 1.664 m (5' 5.5\")   Wt 87 kg (191 lb 12.8 oz)   SpO2 98%   BMI 31.43 kg/m    Physical Exam  GENERAL: healthy, alert and no distress  EYES: Eyes grossly normal to inspection, PERRL and conjunctivae and sclerae normal  HENT: ear canals and TM's normal, nose and mouth without ulcers or lesions  NECK: no adenopathy, no asymmetry, masses, or scars and thyroid normal to palpation  RESP: lungs clear to auscultation - no rales, rhonchi or wheezes  BREAST: normal without masses, tenderness or nipple discharge and no palpable axillary masses or adenopathy  CV: regular rate and rhythm, normal S1 S2, no S3 or S4, no murmur, click or rub, no peripheral edema and peripheral pulses strong  ABDOMEN: soft, nontender, no hepatosplenomegaly, no masses and bowel sounds normal   (female): normal female external genitalia, normal urethral meatus, vaginal mucosa pink, moist, well ruggated. Cervix with 3 visible cysts, small amount of bloody discharge from os, IUD strings visible  MS: no gross musculoskeletal defects noted, no edema  SKIN: no suspicious lesions or rashes  NEURO: Normal strength and tone, mentation intact and speech normal  PSYCH: mentation appears normal, affect normal/bright    Diagnostic Test Results:  pending    ASSESSMENT/PLAN:       ICD-10-CM    1. Routine general medical examination at a health care facility  Z00.00 Comprehensive metabolic panel     Lipid panel reflex to direct LDL Fasting     CBC with platelets     NEISSERIA GONORRHOEA PCR     CHLAMYDIA TRACHOMATIS PCR   2. RAMÓN (generalized anxiety disorder)  F41.1 Well controlled off medication, monitor.     3. Migraine without aura and without status migrainosus, not intractable  G43.009 SUMAtriptan (IMITREX) 100 MG tablet  Well controlled, continue current medications     4. IUD (intrauterine device) in place  Z97.5 Due for change - patient to make follow up with Ob/Gyn    5. Cervical " "cyst  N88.8 Likely nabothian cyst, but bleeding history is concerning - patient to follow up with her gynecologist     6. Seasonal affective disorder (H)  F33.8 Doing well, monitor - keep up light box and vitamin D supplementation, to alert me with worsening           COUNSELING:  Special attention given to:        Regular exercise       Healthy diet/nutrition       Vision screening       Contraception    Estimated body mass index is 31.43 kg/m  as calculated from the following:    Height as of this encounter: 1.664 m (5' 5.5\").    Weight as of this encounter: 87 kg (191 lb 12.8 oz).    Weight management plan: Discussed healthy diet and exercise guidelines    She reports that she has never smoked. She has never used smokeless tobacco.      Counseling Resources:  ATP IV Guidelines  Pooled Cohorts Equation Calculator  Breast Cancer Risk Calculator  BRCA-Related Cancer Risk Assessment: FHS-7 Tool  FRAX Risk Assessment  ICSI Preventive Guidelines  Dietary Guidelines for Americans, 2010  USDA's MyPlate  ASA Prophylaxis  Lung CA Screening    Rita Arreguin MD  [facility]  "

## 2020-12-17 LAB
ALBUMIN SERPL-MCNC: 4.1 G/DL (ref 3.4–5)
ALP SERPL-CCNC: 45 U/L (ref 40–150)
ALT SERPL W P-5'-P-CCNC: 32 U/L (ref 0–50)
ANION GAP SERPL CALCULATED.3IONS-SCNC: 7 MMOL/L (ref 3–14)
AST SERPL W P-5'-P-CCNC: 21 U/L (ref 0–45)
BILIRUB SERPL-MCNC: 1.2 MG/DL (ref 0.2–1.3)
BUN SERPL-MCNC: 15 MG/DL (ref 7–30)
C TRACH DNA SPEC QL NAA+PROBE: NEGATIVE
CALCIUM SERPL-MCNC: 8.9 MG/DL (ref 8.5–10.1)
CHLORIDE SERPL-SCNC: 105 MMOL/L (ref 94–109)
CHOLEST SERPL-MCNC: 197 MG/DL
CO2 SERPL-SCNC: 25 MMOL/L (ref 20–32)
CREAT SERPL-MCNC: 0.81 MG/DL (ref 0.52–1.04)
GFR SERPL CREATININE-BSD FRML MDRD: >90 ML/MIN/{1.73_M2}
GLUCOSE SERPL-MCNC: 66 MG/DL (ref 70–99)
HDLC SERPL-MCNC: 38 MG/DL
LDLC SERPL CALC-MCNC: 144 MG/DL
N GONORRHOEA DNA SPEC QL NAA+PROBE: NEGATIVE
NONHDLC SERPL-MCNC: 159 MG/DL
POTASSIUM SERPL-SCNC: 3.7 MMOL/L (ref 3.4–5.3)
PROT SERPL-MCNC: 7 G/DL (ref 6.8–8.8)
SODIUM SERPL-SCNC: 137 MMOL/L (ref 133–144)
SPECIMEN SOURCE: NORMAL
SPECIMEN SOURCE: NORMAL
TRIGL SERPL-MCNC: 75 MG/DL

## 2020-12-17 ASSESSMENT — ANXIETY QUESTIONNAIRES: GAD7 TOTAL SCORE: 2

## 2021-04-09 ENCOUNTER — OFFICE VISIT (OUTPATIENT)
Dept: URGENT CARE | Facility: URGENT CARE | Age: 36
End: 2021-04-09
Payer: COMMERCIAL

## 2021-04-09 VITALS
DIASTOLIC BLOOD PRESSURE: 84 MMHG | OXYGEN SATURATION: 97 % | HEART RATE: 83 BPM | TEMPERATURE: 99.6 F | SYSTOLIC BLOOD PRESSURE: 128 MMHG

## 2021-04-09 DIAGNOSIS — J02.9 SORE THROAT: ICD-10-CM

## 2021-04-09 DIAGNOSIS — H66.91 RIGHT ACUTE OTITIS MEDIA: Primary | ICD-10-CM

## 2021-04-09 LAB
DEPRECATED S PYO AG THROAT QL EIA: NEGATIVE
SPECIMEN SOURCE: NORMAL

## 2021-04-09 PROCEDURE — 99N1174 PR STATISTIC STREP A RAPID: Performed by: PHYSICIAN ASSISTANT

## 2021-04-09 PROCEDURE — 87651 STREP A DNA AMP PROBE: CPT | Performed by: PHYSICIAN ASSISTANT

## 2021-04-09 PROCEDURE — 99213 OFFICE O/P EST LOW 20 MIN: CPT | Performed by: PHYSICIAN ASSISTANT

## 2021-04-09 RX ORDER — AMOXICILLIN 500 MG/1
500 CAPSULE ORAL 3 TIMES DAILY
Qty: 30 CAPSULE | Refills: 0 | Status: SHIPPED | OUTPATIENT
Start: 2021-04-09 | End: 2021-04-19

## 2021-04-09 NOTE — PROGRESS NOTES
Assessment & Plan        1. Right acute otitis media    - amoxicillin (AMOXIL) 500 MG capsule; Take 1 capsule (500 mg) by mouth 3 times daily for 10 days  Dispense: 30 capsule; Refill: 0  - Group A Streptococcus PCR Throat Swab    2. Sore throat    - Streptococcus A Rapid Scr w Reflx to PCR    Follow up if not improving over the next 3 days.               ELVIS [facility] Blake is a 35 year old female who presents to clinic today for the following health issues:  Chief Complaint   Patient presents with     Urgent Care     Pharyngitis     swollen lymph noticed today along with ear ache, did test negative for COVID result yesterday, head ache, 99.9 temp early today      HPI    Here with ear pain right today, Sore throat all week. Lump behind right ear.  No cough. Some fatigue. No strep exposure. No nasal congestion. Low grade fevers. 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 Negative for COVID yesterday.           Review of Systems        Objective    /84   Pulse 83   Temp 99.6  F (37.6  C)   SpO2 97%   Physical Exam  HENT:      Right Ear: Hearing normal. Tympanic membrane is bulging.      Left Ear: Hearing, tympanic membrane and ear canal normal.      Ears:        Mouth/Throat:      Mouth: Mucous membranes are moist.      Pharynx: Posterior oropharyngeal erythema present.   Cardiovascular:      Rate and Rhythm: Normal rate and regular rhythm.      Heart sounds: Normal heart sounds.   Pulmonary:      Effort: Pulmonary effort is normal.      Breath sounds: Normal breath sounds and air entry.

## 2021-04-10 LAB
SPECIMEN SOURCE: NORMAL
STREP GROUP A PCR: NOT DETECTED

## 2021-10-24 ENCOUNTER — HEALTH MAINTENANCE LETTER (OUTPATIENT)
Age: 36
End: 2021-10-24

## 2021-11-10 ENCOUNTER — E-VISIT (OUTPATIENT)
Dept: FAMILY MEDICINE | Facility: CLINIC | Age: 36
End: 2021-11-10
Payer: COMMERCIAL

## 2021-11-10 DIAGNOSIS — H10.30 ACUTE CONJUNCTIVITIS, UNSPECIFIED ACUTE CONJUNCTIVITIS TYPE, UNSPECIFIED LATERALITY: Primary | ICD-10-CM

## 2021-11-10 PROCEDURE — 99421 OL DIG E/M SVC 5-10 MIN: CPT | Performed by: PHYSICIAN ASSISTANT

## 2021-11-10 RX ORDER — OFLOXACIN 3 MG/ML
1-2 SOLUTION/ DROPS OPHTHALMIC EVERY 4 HOURS
Qty: 5 ML | Refills: 0 | Status: SHIPPED | OUTPATIENT
Start: 2021-11-10 | End: 2021-11-15

## 2021-11-10 NOTE — PATIENT INSTRUCTIONS
Thank you for choosing us for your care.    Based on the picture you uploaded I am not overly concerned for bacterial conjunctivitis.  This may be having some irritation, allergies or something else like leaving the contact in too long.  You can use saline to make sure the eye is moisturized and not wear contacts for the next 24 to 48 hours.  If you continue having purulent discharge, crusting of your eyelid or worsening redness of the white part of your eye then start the antibiotic drops that I sent to your pharmacy below.  You can pick this up before you leave for vacation and have it while you are gone.     I have placed an order for a prescription so that you can start treatment. View your full visit summary for details by clicking on the link below. Your pharmacist will able to address any questions you may have about the medication.     If you re not feeling better within 2-3 days, please schedule an appointment.  You can schedule an appointment right here in [facility], or call [phone]  If the visit is for the same symptoms as your eVisit, we ll refund the cost of your eVisit if seen within seven days.      Conjunctivitis, Nonspecific    The membrane that covers the white part of your eye (the conjunctiva) is inflamed. Inflammation happens when your body responds to an injury, allergic reaction, infection, or illness. Symptoms of inflammation in the eye may include redness, irritation, itching, swelling, or burning. These symptoms should go away within the next 24 hours. Conjunctivitis may be related to a particle that was in your eye. If so, it may wash out with your tears or irrigation treatment. Being exposed to liquid chemicals or fumes may also cause this reaction.    Home care    Put a cold pack on the eye for 20 minutes at a time. This will reduce pain. To make a cold pack, put ice cubes in a plastic bag that seals at the top. Wrap the bag in a clean, thin towel or cloth.    Artificial tears may  be prescribed to reduce irritation or redness. These should be used 3 to 4 times a day.    You may use acetaminophen or ibuprofen to control pain, unless another medicine was prescribed. If you have chronic liver or kidney disease, talk with your healthcare provider before using these medicines. Also talk with your provider if you have ever had a stomach ulcer or gastrointestinal bleeding.    Follow-up care  Follow up with your healthcare provider, or as advised.  When to seek medical advice  Call your healthcare provider right away if any of these occur:    Eyelid swells more    Eye pain gets worse    Redness or drainage from the eye gets worse    Blurry vision gets worse or you have increased sensitivity to light    Normal vision does not return within 24 to 48 hours  [employer] last reviewed this educational content on 2/1/2020 2000-2021 [employer]. All rights reserved. This information is not intended as a substitute for professional medical care. Always follow your healthcare professional's instructions.

## 2022-01-06 ENCOUNTER — E-VISIT (OUTPATIENT)
Dept: URGENT CARE | Facility: CLINIC | Age: 37
End: 2022-01-06
Payer: COMMERCIAL

## 2022-01-06 DIAGNOSIS — N39.0 ACUTE UTI (URINARY TRACT INFECTION): Primary | ICD-10-CM

## 2022-01-06 PROCEDURE — 99421 OL DIG E/M SVC 5-10 MIN: CPT | Performed by: PHYSICIAN ASSISTANT

## 2022-01-06 RX ORDER — NITROFURANTOIN 25; 75 MG/1; MG/1
100 CAPSULE ORAL 2 TIMES DAILY
Qty: 10 CAPSULE | Refills: 0 | Status: SHIPPED | OUTPATIENT
Start: 2022-01-06 | End: 2023-05-24

## 2022-01-06 NOTE — PATIENT INSTRUCTIONS
Dear Lou Nix    After reviewing your responses, I've been able to diagnose you with a urinary tract infection, which is a common infection of the bladder with bacteria.  This is not a sexually transmitted infection, though urinating immediately after intercourse can help prevent infections.  Drinking lots of fluids is also helpful to clear your current infection and prevent the next one.      I have sent a prescription for antibiotics to your pharmacy to treat this infection.    It is important that you take all of your prescribed medication even if your symptoms are improving after a few doses.  Taking all of your medicine helps prevent the symptoms from returning.     If your symptoms worsen, you develop pain in your back or stomach, develop fevers, or are not improving in 5 days, please contact your primary care provider for an appointment or visit any of our convenient Walk-in or Urgent Care Centers to be seen, which can be found on our website here.    Thanks again for choosing us as your health care partner,    Uri Mejía PA-C    Urinary Tract Infections in Women  Urinary tract infections (UTIs) are most often caused by bacteria. These bacteria enter the urinary tract. The bacteria may come from inside the body. Or they may travel from the skin outside the rectum or vagina into the urethra. Female anatomy makes it easy for bacteria from the bowel to enter a woman s urinary tract, which is the most common source of UTI. This means women develop UTIs more often than men. Pain in or around the urinary tract is a common UTI symptom. But the only way to know for sure if you have a UTI for the healthcare provider to test your urine. The two tests that may be done are the urinalysis and urine culture.     Types of UTIs    Cystitis. A bladder infection (cystitis) is the most common UTI in women. You may have urgent or frequent need to pee. You may also have pain, burning when you pee, and bloody  urine.    Urethritis. This is an inflamed urethra, which is the tube that carries urine from the bladder to outside the body. You may have lower stomach or back pain. You may also have urgent or frequent need to pee.    Pyelonephritis. This is a kidney infection. If not treated, it can be serious and damage your kidneys. In severe cases, you may need to stay in the hospital. You may have a fever and lower back pain.    Medicines to treat a UTI  Most UTIs are treated with antibiotics. These kill the bacteria. The length of time you need to take them depends on the type of infection. It may be as short as 3 days. If you have repeated UTIs, you may need a low-dose antibiotic for several months. Take antibiotics exactly as directed. Don t stop taking them until all of the medicine is gone. If you stop taking the antibiotic too soon, the infection may not go away. You may also develop a resistance to the antibiotic. This can make it much harder to treat.   Lifestyle changes to treat and prevent UTIs   The lifestyle changes below will help get rid of your UTI. They may also help prevent future UTIs.     Drink plenty of fluids. This includes water, juice, or other caffeine-free drinks. Fluids help flush bacteria out of your body.    Empty your bladder. Always empty your bladder when you feel the urge to pee. And always pee before going to sleep. Urine that stays in your bladder can lead to infection. Try to pee before and after sex as well.    Practice good personal hygiene. Wipe yourself from front to back after using the toilet. This helps keep bacteria from getting into the urethra.    Use condoms during sex. These help prevent UTIs caused by sexually transmitted bacteria. Also don't use spermicides during sex. These can increase the risk for UTIs. Choose other forms of birth control instead. For women who tend to get UTIs after sex, a low-dose of a preventive antibiotic may be used. Be sure to discuss this option with  your healthcare provider.    Follow up with your healthcare provider as directed. He or she may test to make sure the infection has cleared. If needed, more treatment may be started.  [employer] last reviewed this educational content on 7/1/2019 2000-2021 [employer]. All rights reserved. This information is not intended as a substitute for professional medical care. Always follow your healthcare professional's instructions.

## 2022-02-13 ENCOUNTER — HEALTH MAINTENANCE LETTER (OUTPATIENT)
Age: 37
End: 2022-02-13

## 2022-04-27 ENCOUNTER — OFFICE VISIT (OUTPATIENT)
Dept: PEDIATRICS | Facility: CLINIC | Age: 37
End: 2022-04-27
Payer: COMMERCIAL

## 2022-04-27 ENCOUNTER — PATIENT OUTREACH (OUTPATIENT)
Dept: ONCOLOGY | Facility: CLINIC | Age: 37
End: 2022-04-27

## 2022-04-27 VITALS
SYSTOLIC BLOOD PRESSURE: 120 MMHG | WEIGHT: 184 LBS | OXYGEN SATURATION: 98 % | DIASTOLIC BLOOD PRESSURE: 76 MMHG | HEIGHT: 66 IN | RESPIRATION RATE: 16 BRPM | HEART RATE: 98 BPM | BODY MASS INDEX: 29.57 KG/M2 | TEMPERATURE: 98.5 F

## 2022-04-27 DIAGNOSIS — Z00.00 ROUTINE GENERAL MEDICAL EXAMINATION AT A HEALTH CARE FACILITY: Primary | ICD-10-CM

## 2022-04-27 DIAGNOSIS — G43.009 MIGRAINE WITHOUT AURA AND WITHOUT STATUS MIGRAINOSUS, NOT INTRACTABLE: ICD-10-CM

## 2022-04-27 DIAGNOSIS — Z80.0 FAMILY HISTORY OF COLON CANCER: ICD-10-CM

## 2022-04-27 DIAGNOSIS — F33.8 SEASONAL AFFECTIVE DISORDER (H): ICD-10-CM

## 2022-04-27 DIAGNOSIS — Z80.41 FAMILY HISTORY OF MALIGNANT NEOPLASM OF OVARY: ICD-10-CM

## 2022-04-27 DIAGNOSIS — Z12.11 COLON CANCER SCREENING: ICD-10-CM

## 2022-04-27 LAB
ALBUMIN SERPL-MCNC: 3.9 G/DL (ref 3.4–5)
ALP SERPL-CCNC: 53 U/L (ref 40–150)
ALT SERPL W P-5'-P-CCNC: 64 U/L (ref 0–50)
ANION GAP SERPL CALCULATED.3IONS-SCNC: 6 MMOL/L (ref 3–14)
AST SERPL W P-5'-P-CCNC: 29 U/L (ref 0–45)
BILIRUB SERPL-MCNC: 1 MG/DL (ref 0.2–1.3)
BUN SERPL-MCNC: 13 MG/DL (ref 7–30)
CALCIUM SERPL-MCNC: 9.3 MG/DL (ref 8.5–10.1)
CHLORIDE BLD-SCNC: 109 MMOL/L (ref 94–109)
CHOLEST SERPL-MCNC: 192 MG/DL
CO2 SERPL-SCNC: 24 MMOL/L (ref 20–32)
CREAT SERPL-MCNC: 0.79 MG/DL (ref 0.52–1.04)
FASTING STATUS PATIENT QL REPORTED: YES
GFR SERPL CREATININE-BSD FRML MDRD: >90 ML/MIN/1.73M2
GLUCOSE BLD-MCNC: 90 MG/DL (ref 70–99)
HDLC SERPL-MCNC: 42 MG/DL
LDLC SERPL CALC-MCNC: 122 MG/DL
NONHDLC SERPL-MCNC: 150 MG/DL
POTASSIUM BLD-SCNC: 4.1 MMOL/L (ref 3.4–5.3)
PROT SERPL-MCNC: 7 G/DL (ref 6.8–8.8)
SODIUM SERPL-SCNC: 139 MMOL/L (ref 133–144)
TRIGL SERPL-MCNC: 139 MG/DL

## 2022-04-27 PROCEDURE — 80061 LIPID PANEL: CPT | Performed by: PEDIATRICS

## 2022-04-27 PROCEDURE — 36415 COLL VENOUS BLD VENIPUNCTURE: CPT | Performed by: PEDIATRICS

## 2022-04-27 PROCEDURE — 99395 PREV VISIT EST AGE 18-39: CPT | Mod: 25 | Performed by: PEDIATRICS

## 2022-04-27 PROCEDURE — 90715 TDAP VACCINE 7 YRS/> IM: CPT | Performed by: PEDIATRICS

## 2022-04-27 PROCEDURE — 90471 IMMUNIZATION ADMIN: CPT | Performed by: PEDIATRICS

## 2022-04-27 PROCEDURE — 80053 COMPREHEN METABOLIC PANEL: CPT | Performed by: PEDIATRICS

## 2022-04-27 RX ORDER — SUMATRIPTAN 100 MG/1
TABLET, FILM COATED ORAL
Qty: 18 TABLET | Refills: 11 | Status: SHIPPED | OUTPATIENT
Start: 2022-04-27 | End: 2023-05-24

## 2022-04-27 SDOH — ECONOMIC STABILITY: TRANSPORTATION INSECURITY
IN THE PAST 12 MONTHS, HAS THE LACK OF TRANSPORTATION KEPT YOU FROM MEDICAL APPOINTMENTS OR FROM GETTING MEDICATIONS?: NO

## 2022-04-27 SDOH — ECONOMIC STABILITY: TRANSPORTATION INSECURITY
IN THE PAST 12 MONTHS, HAS LACK OF TRANSPORTATION KEPT YOU FROM MEETINGS, WORK, OR FROM GETTING THINGS NEEDED FOR DAILY LIVING?: NO

## 2022-04-27 SDOH — ECONOMIC STABILITY: INCOME INSECURITY: HOW HARD IS IT FOR YOU TO PAY FOR THE VERY BASICS LIKE FOOD, HOUSING, MEDICAL CARE, AND HEATING?: NOT HARD AT ALL

## 2022-04-27 SDOH — ECONOMIC STABILITY: FOOD INSECURITY: WITHIN THE PAST 12 MONTHS, YOU WORRIED THAT YOUR FOOD WOULD RUN OUT BEFORE YOU GOT MONEY TO BUY MORE.: NEVER TRUE

## 2022-04-27 SDOH — HEALTH STABILITY: PHYSICAL HEALTH: ON AVERAGE, HOW MANY MINUTES DO YOU ENGAGE IN EXERCISE AT THIS LEVEL?: 30 MIN

## 2022-04-27 SDOH — ECONOMIC STABILITY: FOOD INSECURITY: WITHIN THE PAST 12 MONTHS, THE FOOD YOU BOUGHT JUST DIDN'T LAST AND YOU DIDN'T HAVE MONEY TO GET MORE.: NEVER TRUE

## 2022-04-27 SDOH — HEALTH STABILITY: PHYSICAL HEALTH: ON AVERAGE, HOW MANY DAYS PER WEEK DO YOU ENGAGE IN MODERATE TO STRENUOUS EXERCISE (LIKE A BRISK WALK)?: 1 DAY

## 2022-04-27 SDOH — ECONOMIC STABILITY: INCOME INSECURITY: IN THE LAST 12 MONTHS, WAS THERE A TIME WHEN YOU WERE NOT ABLE TO PAY THE MORTGAGE OR RENT ON TIME?: NO

## 2022-04-27 ASSESSMENT — ENCOUNTER SYMPTOMS
FREQUENCY: 0
DIARRHEA: 0
NAUSEA: 0
WEAKNESS: 0
DYSURIA: 0
SORE THROAT: 0
CONSTIPATION: 0
JOINT SWELLING: 0
NERVOUS/ANXIOUS: 0
FEVER: 0
COUGH: 0
PARESTHESIAS: 0
HEADACHES: 0
ARTHRALGIAS: 0
MYALGIAS: 0
SHORTNESS OF BREATH: 0
DIZZINESS: 0
BREAST MASS: 0
CHILLS: 0
PALPITATIONS: 0
HEARTBURN: 0
EYE PAIN: 0
ABDOMINAL PAIN: 0
HEMATOCHEZIA: 0
HEMATURIA: 0

## 2022-04-27 ASSESSMENT — SOCIAL DETERMINANTS OF HEALTH (SDOH)
HOW OFTEN DO YOU GET TOGETHER WITH FRIENDS OR RELATIVES?: ONCE A WEEK
HOW OFTEN DO YOU ATTEND CHURCH OR RELIGIOUS SERVICES?: 1 TO 4 TIMES PER YEAR
IN A TYPICAL WEEK, HOW MANY TIMES DO YOU TALK ON THE PHONE WITH FAMILY, FRIENDS, OR NEIGHBORS?: MORE THAN THREE TIMES A WEEK
DO YOU BELONG TO ANY CLUBS OR ORGANIZATIONS SUCH AS CHURCH GROUPS UNIONS, FRATERNAL OR ATHLETIC GROUPS, OR SCHOOL GROUPS?: YES

## 2022-04-27 ASSESSMENT — LIFESTYLE VARIABLES
HOW OFTEN DO YOU HAVE A DRINK CONTAINING ALCOHOL: MONTHLY OR LESS
HOW MANY STANDARD DRINKS CONTAINING ALCOHOL DO YOU HAVE ON A TYPICAL DAY: 1 OR 2
SKIP TO QUESTIONS 9-10: 1
AUDIT-C TOTAL SCORE: 1
HOW OFTEN DO YOU HAVE SIX OR MORE DRINKS ON ONE OCCASION: NEVER

## 2022-04-27 ASSESSMENT — ANXIETY QUESTIONNAIRES
7. FEELING AFRAID AS IF SOMETHING AWFUL MIGHT HAPPEN: NOT AT ALL
GAD7 TOTAL SCORE: 8
6. BECOMING EASILY ANNOYED OR IRRITABLE: MORE THAN HALF THE DAYS
2. NOT BEING ABLE TO STOP OR CONTROL WORRYING: SEVERAL DAYS
5. BEING SO RESTLESS THAT IT IS HARD TO SIT STILL: MORE THAN HALF THE DAYS
IF YOU CHECKED OFF ANY PROBLEMS ON THIS QUESTIONNAIRE, HOW DIFFICULT HAVE THESE PROBLEMS MADE IT FOR YOU TO DO YOUR WORK, TAKE CARE OF THINGS AT HOME, OR GET ALONG WITH OTHER PEOPLE: SOMEWHAT DIFFICULT
1. FEELING NERVOUS, ANXIOUS, OR ON EDGE: SEVERAL DAYS
3. WORRYING TOO MUCH ABOUT DIFFERENT THINGS: SEVERAL DAYS

## 2022-04-27 ASSESSMENT — PATIENT HEALTH QUESTIONNAIRE - PHQ9
SUM OF ALL RESPONSES TO PHQ QUESTIONS 1-9: 11
5. POOR APPETITE OR OVEREATING: SEVERAL DAYS

## 2022-04-27 NOTE — PATIENT INSTRUCTIONS
Expect call to schedule with cancer genetics and colonoscopy - call insurance first    Tetanus booster    Labs today    Keep an eye on mood      Preventive Health Recommendations  Female Ages 26 - 39  Yearly exam:   See your health care provider every year in order to  Review health changes.   Discuss preventive care.    Review your medicines if you your doctor has prescribed any.    Until age 30: Get a Pap test every three years (more often if you have had an abnormal result).    After age 30: Talk to your doctor about whether you should have a Pap test every 3 years or have a Pap test with HPV screening every 5 years.   You do not need a Pap test if your uterus was removed (hysterectomy) and you have not had cancer.  You should be tested each year for STDs (sexually transmitted diseases), if you're at risk.   Talk to your provider about how often to have your cholesterol checked.  If you are at risk for diabetes, you should have a diabetes test (fasting glucose).  Shots: Get a flu shot each year. Get a tetanus shot every 10 years.   Nutrition:   Eat at least 5 servings of fruits and vegetables each day.  Eat whole-grain bread, whole-wheat pasta and brown rice instead of white grains and rice.  Get adequate Calcium and Vitamin D.     Lifestyle  Exercise at least 150 minutes a week (30 minutes a day, 5 days of the week). This will help you control your weight and prevent disease.  Limit alcohol to one drink per day.  No smoking.   Wear sunscreen to prevent skin cancer.  See your dentist every six months for an exam and cleaning.
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
## 2022-04-28 ASSESSMENT — ANXIETY QUESTIONNAIRES: GAD7 TOTAL SCORE: 8

## 2022-05-19 ENCOUNTER — APPOINTMENT (OUTPATIENT)
Dept: URGENT CARE | Facility: CLINIC | Age: 37
End: 2022-05-19
Payer: COMMERCIAL

## 2022-08-30 ENCOUNTER — MYC MEDICAL ADVICE (OUTPATIENT)
Dept: PEDIATRICS | Facility: CLINIC | Age: 37
End: 2022-08-30

## 2022-08-30 DIAGNOSIS — F41.1 GAD (GENERALIZED ANXIETY DISORDER): ICD-10-CM

## 2022-08-30 DIAGNOSIS — F33.0 MILD RECURRENT MAJOR DEPRESSION (H): ICD-10-CM

## 2022-08-30 DIAGNOSIS — F33.8 SEASONAL AFFECTIVE DISORDER (H): ICD-10-CM

## 2022-08-30 RX ORDER — CITALOPRAM HYDROBROMIDE 20 MG/1
20 TABLET ORAL DAILY
Qty: 90 TABLET | Refills: 3 | Status: SHIPPED | OUTPATIENT
Start: 2022-08-30 | End: 2023-05-24

## 2022-10-15 ENCOUNTER — HEALTH MAINTENANCE LETTER (OUTPATIENT)
Age: 37
End: 2022-10-15

## 2023-01-19 ENCOUNTER — OFFICE VISIT (OUTPATIENT)
Dept: URGENT CARE | Facility: URGENT CARE | Age: 38
End: 2023-01-19
Payer: COMMERCIAL

## 2023-01-19 VITALS
SYSTOLIC BLOOD PRESSURE: 120 MMHG | OXYGEN SATURATION: 98 % | RESPIRATION RATE: 20 BRPM | TEMPERATURE: 99.4 F | DIASTOLIC BLOOD PRESSURE: 79 MMHG | HEART RATE: 87 BPM

## 2023-01-19 DIAGNOSIS — J22 LOWER RESPIRATORY TRACT INFECTION: Primary | ICD-10-CM

## 2023-01-19 PROCEDURE — 99213 OFFICE O/P EST LOW 20 MIN: CPT | Performed by: PHYSICIAN ASSISTANT

## 2023-01-19 RX ORDER — BENZONATATE 100 MG/1
100-200 CAPSULE ORAL 3 TIMES DAILY PRN
Qty: 30 CAPSULE | Refills: 0 | Status: SHIPPED | OUTPATIENT
Start: 2023-01-19 | End: 2023-05-24

## 2023-01-19 RX ORDER — FLUCONAZOLE 150 MG/1
150 TABLET ORAL ONCE
Qty: 1 TABLET | Refills: 0 | Status: SHIPPED | OUTPATIENT
Start: 2023-01-19 | End: 2023-01-19

## 2023-01-19 RX ORDER — DOXYCYCLINE 100 MG/1
100 CAPSULE ORAL 2 TIMES DAILY
Qty: 14 CAPSULE | Refills: 0 | Status: SHIPPED | OUTPATIENT
Start: 2023-01-19 | End: 2023-01-26

## 2023-01-19 RX ORDER — CODEINE PHOSPHATE AND GUAIFENESIN 10; 100 MG/5ML; MG/5ML
1 SOLUTION ORAL
Qty: 50 ML | Refills: 0 | Status: SHIPPED | OUTPATIENT
Start: 2023-01-19 | End: 2023-05-24

## 2023-01-19 NOTE — PROGRESS NOTES
Assessment/Plan:    No acute distress or toxicity noted. Lungs CTAB, no signs of pneumonia. Suspect acute bronchitis, likely bacterial etiology due to double sickening and now fever today. Rx doxycycline as well as Tessalon and Robitussin AC, pt requests fluconazole as she often gets yeast infections when on antibiotics.    See patient instructions below.  At the end of the encounter, I discussed results, diagnosis, medications. Discussed red flags for immediate return to clinic/ER, as well as indications for follow up if no improvement. Patient understood and agreed to plan. Patient was stable for discharge.      ICD-10-CM    1. Lower respiratory tract infection  J22 doxycycline hyclate (VIBRAMYCIN) 100 MG capsule     benzonatate (TESSALON) 100 MG capsule     guaiFENesin-codeine (ROBITUSSIN AC) 100-10 MG/5ML solution     fluconazole (DIFLUCAN) 150 MG tablet            Return in about 1 week (around 1/26/2023) for Follow up w/ primary care provider if not better.    BRADY Hernandez, PA-[facility]    ------------------------------------------------------------------------------------------------------------------------------------------------------------------------  HPI:  Lou Nix is a 37 year old female who presents for evaluation of cough & nasal congestion onset 3 weeks ago. Symptoms have improved then worsened again a couple of times, and today she had a fever of 100.6 F. Cough is worse at night. No treatments tried. Patient reports no sinus pressure, myalgias, sore throat, loss of sense of taste or smell, headache, chest pain, shortness of breath, abdominal pain, nausea, vomiting, diarrhea, rash, or any other symptoms. No known sick contacts/COVID exposure. She had a negative home COVID test 3 days ago.      Past Medical History:   Diagnosis Date     ASCUS favor benign 06/2014    Co-test 3 yrs     Depressive disorder     post-partum     Headache      Microhematuria      pt reports longstanding      Vesico-ureteral reflux age 10     s/p surgery        Vitals:    01/19/23 1427   BP: 120/79   Pulse: 87   Resp: 20   Temp: 99.4  F (37.4  C)   SpO2: 98%       Physical Exam  Vitals and nursing note reviewed.   HENT:      Mouth/Throat:      Mouth: Mucous membranes are moist.      Pharynx: Oropharynx is clear.   Cardiovascular:      Rate and Rhythm: Normal rate and regular rhythm.   Pulmonary:      Effort: Pulmonary effort is normal.      Breath sounds: Normal breath sounds.   Neurological:      Mental Status: She is alert.         Labs/Imaging:  No results found for this or any previous visit (from the past 24 hour(s)).  No results found for this or any previous visit (from the past 24 hour(s)).      There are no Patient Instructions on file for this visit.

## 2023-05-23 SDOH — HEALTH STABILITY: PHYSICAL HEALTH: ON AVERAGE, HOW MANY DAYS PER WEEK DO YOU ENGAGE IN MODERATE TO STRENUOUS EXERCISE (LIKE A BRISK WALK)?: 3 DAYS

## 2023-05-23 SDOH — ECONOMIC STABILITY: INCOME INSECURITY: IN THE LAST 12 MONTHS, WAS THERE A TIME WHEN YOU WERE NOT ABLE TO PAY THE MORTGAGE OR RENT ON TIME?: NO

## 2023-05-23 SDOH — ECONOMIC STABILITY: FOOD INSECURITY: WITHIN THE PAST 12 MONTHS, THE FOOD YOU BOUGHT JUST DIDN'T LAST AND YOU DIDN'T HAVE MONEY TO GET MORE.: NEVER TRUE

## 2023-05-23 SDOH — ECONOMIC STABILITY: INCOME INSECURITY: HOW HARD IS IT FOR YOU TO PAY FOR THE VERY BASICS LIKE FOOD, HOUSING, MEDICAL CARE, AND HEATING?: NOT HARD AT ALL

## 2023-05-23 SDOH — ECONOMIC STABILITY: FOOD INSECURITY: WITHIN THE PAST 12 MONTHS, YOU WORRIED THAT YOUR FOOD WOULD RUN OUT BEFORE YOU GOT MONEY TO BUY MORE.: NEVER TRUE

## 2023-05-23 SDOH — HEALTH STABILITY: PHYSICAL HEALTH: ON AVERAGE, HOW MANY MINUTES DO YOU ENGAGE IN EXERCISE AT THIS LEVEL?: 30 MIN

## 2023-05-23 ASSESSMENT — ENCOUNTER SYMPTOMS
PARESTHESIAS: 0
SORE THROAT: 0
DYSURIA: 0
CONSTIPATION: 0
HEARTBURN: 0
HEADACHES: 0
PALPITATIONS: 0
CHILLS: 0
MYALGIAS: 0
SHORTNESS OF BREATH: 0
EYE PAIN: 0
WEAKNESS: 0
NAUSEA: 0
DIARRHEA: 0
FREQUENCY: 0
JOINT SWELLING: 0
DIZZINESS: 0
ARTHRALGIAS: 0
HEMATURIA: 0
HEMATOCHEZIA: 0
BREAST MASS: 0
ABDOMINAL PAIN: 0
NERVOUS/ANXIOUS: 0
FEVER: 0
COUGH: 0

## 2023-05-23 ASSESSMENT — PATIENT HEALTH QUESTIONNAIRE - PHQ9
SUM OF ALL RESPONSES TO PHQ QUESTIONS 1-9: 9
SUM OF ALL RESPONSES TO PHQ QUESTIONS 1-9: 9
10. IF YOU CHECKED OFF ANY PROBLEMS, HOW DIFFICULT HAVE THESE PROBLEMS MADE IT FOR YOU TO DO YOUR WORK, TAKE CARE OF THINGS AT HOME, OR GET ALONG WITH OTHER PEOPLE: SOMEWHAT DIFFICULT

## 2023-05-23 ASSESSMENT — LIFESTYLE VARIABLES
HOW MANY STANDARD DRINKS CONTAINING ALCOHOL DO YOU HAVE ON A TYPICAL DAY: PATIENT DOES NOT DRINK
HOW OFTEN DO YOU HAVE A DRINK CONTAINING ALCOHOL: NEVER
SKIP TO QUESTIONS 9-10: 1
HOW OFTEN DO YOU HAVE SIX OR MORE DRINKS ON ONE OCCASION: NEVER
AUDIT-C TOTAL SCORE: 0

## 2023-05-23 ASSESSMENT — SOCIAL DETERMINANTS OF HEALTH (SDOH)
HOW OFTEN DO YOU ATTEND CHURCH OR RELIGIOUS SERVICES?: 1 TO 4 TIMES PER YEAR
IN A TYPICAL WEEK, HOW MANY TIMES DO YOU TALK ON THE PHONE WITH FAMILY, FRIENDS, OR NEIGHBORS?: MORE THAN THREE TIMES A WEEK
DO YOU BELONG TO ANY CLUBS OR ORGANIZATIONS SUCH AS CHURCH GROUPS UNIONS, FRATERNAL OR ATHLETIC GROUPS, OR SCHOOL GROUPS?: NO
HOW OFTEN DO YOU GET TOGETHER WITH FRIENDS OR RELATIVES?: TWICE A WEEK

## 2023-05-24 ENCOUNTER — OFFICE VISIT (OUTPATIENT)
Dept: PEDIATRICS | Facility: CLINIC | Age: 38
End: 2023-05-24
Payer: COMMERCIAL

## 2023-05-24 VITALS
DIASTOLIC BLOOD PRESSURE: 70 MMHG | SYSTOLIC BLOOD PRESSURE: 100 MMHG | HEART RATE: 78 BPM | RESPIRATION RATE: 16 BRPM | WEIGHT: 196.2 LBS | BODY MASS INDEX: 32.69 KG/M2 | HEIGHT: 65 IN | TEMPERATURE: 98.4 F | OXYGEN SATURATION: 97 %

## 2023-05-24 DIAGNOSIS — R79.89 ELEVATED FERRITIN: ICD-10-CM

## 2023-05-24 DIAGNOSIS — E55.9 VITAMIN D DEFICIENCY: ICD-10-CM

## 2023-05-24 DIAGNOSIS — Z80.0 FAMILY HISTORY OF COLON CANCER: ICD-10-CM

## 2023-05-24 DIAGNOSIS — N39.0 ACUTE UTI (URINARY TRACT INFECTION): ICD-10-CM

## 2023-05-24 DIAGNOSIS — N63.21 MASS OF UPPER OUTER QUADRANT OF LEFT BREAST: ICD-10-CM

## 2023-05-24 DIAGNOSIS — F41.1 GAD (GENERALIZED ANXIETY DISORDER): ICD-10-CM

## 2023-05-24 DIAGNOSIS — Z00.00 ROUTINE GENERAL MEDICAL EXAMINATION AT A HEALTH CARE FACILITY: Primary | ICD-10-CM

## 2023-05-24 DIAGNOSIS — R53.82 CHRONIC FATIGUE: ICD-10-CM

## 2023-05-24 DIAGNOSIS — Z12.11 SPECIAL SCREENING FOR MALIGNANT NEOPLASMS, COLON: ICD-10-CM

## 2023-05-24 DIAGNOSIS — Z12.4 CERVICAL CANCER SCREENING: ICD-10-CM

## 2023-05-24 DIAGNOSIS — Z97.5 IUD (INTRAUTERINE DEVICE) IN PLACE: ICD-10-CM

## 2023-05-24 DIAGNOSIS — F33.0 MILD RECURRENT MAJOR DEPRESSION (H): ICD-10-CM

## 2023-05-24 DIAGNOSIS — G43.009 MIGRAINE WITHOUT AURA AND WITHOUT STATUS MIGRAINOSUS, NOT INTRACTABLE: ICD-10-CM

## 2023-05-24 DIAGNOSIS — F33.8 SEASONAL AFFECTIVE DISORDER (H): ICD-10-CM

## 2023-05-24 DIAGNOSIS — D22.9 MULTIPLE PIGMENTED NEVI: ICD-10-CM

## 2023-05-24 LAB
ALBUMIN SERPL BCG-MCNC: 4.7 G/DL (ref 3.5–5.2)
ALP SERPL-CCNC: 48 U/L (ref 35–104)
ALT SERPL W P-5'-P-CCNC: 31 U/L (ref 10–35)
ANION GAP SERPL CALCULATED.3IONS-SCNC: 11 MMOL/L (ref 7–15)
AST SERPL W P-5'-P-CCNC: 28 U/L (ref 10–35)
BILIRUB SERPL-MCNC: 0.9 MG/DL
BUN SERPL-MCNC: 15.3 MG/DL (ref 6–20)
CALCIUM SERPL-MCNC: 9.3 MG/DL (ref 8.6–10)
CHLORIDE SERPL-SCNC: 106 MMOL/L (ref 98–107)
CHOLEST SERPL-MCNC: 201 MG/DL
CREAT SERPL-MCNC: 0.92 MG/DL (ref 0.51–0.95)
DEPRECATED CALCIDIOL+CALCIFEROL SERPL-MC: 17 UG/L (ref 20–75)
DEPRECATED HCO3 PLAS-SCNC: 22 MMOL/L (ref 22–29)
ERYTHROCYTE [DISTWIDTH] IN BLOOD BY AUTOMATED COUNT: 11.6 % (ref 10–15)
FERRITIN SERPL-MCNC: 376 NG/ML (ref 6–175)
GFR SERPL CREATININE-BSD FRML MDRD: 82 ML/MIN/1.73M2
GLUCOSE SERPL-MCNC: 101 MG/DL (ref 70–99)
HCT VFR BLD AUTO: 41.3 % (ref 35–47)
HDLC SERPL-MCNC: 47 MG/DL
HGB BLD-MCNC: 15 G/DL (ref 11.7–15.7)
LDLC SERPL CALC-MCNC: 135 MG/DL
MCH RBC QN AUTO: 31.7 PG (ref 26.5–33)
MCHC RBC AUTO-ENTMCNC: 36.3 G/DL (ref 31.5–36.5)
MCV RBC AUTO: 87 FL (ref 78–100)
NONHDLC SERPL-MCNC: 154 MG/DL
PLATELET # BLD AUTO: 267 10E3/UL (ref 150–450)
POTASSIUM SERPL-SCNC: 3.8 MMOL/L (ref 3.4–5.3)
PROT SERPL-MCNC: 7 G/DL (ref 6.4–8.3)
RBC # BLD AUTO: 4.73 10E6/UL (ref 3.8–5.2)
SODIUM SERPL-SCNC: 139 MMOL/L (ref 136–145)
TRIGL SERPL-MCNC: 96 MG/DL
TSH SERPL DL<=0.005 MIU/L-ACNC: 1.62 UIU/ML (ref 0.3–4.2)
WBC # BLD AUTO: 6 10E3/UL (ref 4–11)

## 2023-05-24 PROCEDURE — 85027 COMPLETE CBC AUTOMATED: CPT | Performed by: PEDIATRICS

## 2023-05-24 PROCEDURE — 84443 ASSAY THYROID STIM HORMONE: CPT | Performed by: PEDIATRICS

## 2023-05-24 PROCEDURE — 99395 PREV VISIT EST AGE 18-39: CPT | Performed by: PEDIATRICS

## 2023-05-24 PROCEDURE — 80061 LIPID PANEL: CPT | Performed by: PEDIATRICS

## 2023-05-24 PROCEDURE — 87624 HPV HI-RISK TYP POOLED RSLT: CPT | Performed by: PEDIATRICS

## 2023-05-24 PROCEDURE — 83540 ASSAY OF IRON: CPT | Performed by: PEDIATRICS

## 2023-05-24 PROCEDURE — 82728 ASSAY OF FERRITIN: CPT | Performed by: PEDIATRICS

## 2023-05-24 PROCEDURE — 36415 COLL VENOUS BLD VENIPUNCTURE: CPT | Performed by: PEDIATRICS

## 2023-05-24 PROCEDURE — 99214 OFFICE O/P EST MOD 30 MIN: CPT | Mod: 25 | Performed by: PEDIATRICS

## 2023-05-24 PROCEDURE — 82306 VITAMIN D 25 HYDROXY: CPT | Performed by: PEDIATRICS

## 2023-05-24 PROCEDURE — 83550 IRON BINDING TEST: CPT | Performed by: PEDIATRICS

## 2023-05-24 PROCEDURE — 80053 COMPREHEN METABOLIC PANEL: CPT | Performed by: PEDIATRICS

## 2023-05-24 PROCEDURE — G0145 SCR C/V CYTO,THINLAYER,RESCR: HCPCS | Performed by: PEDIATRICS

## 2023-05-24 PROCEDURE — 86140 C-REACTIVE PROTEIN: CPT | Performed by: PEDIATRICS

## 2023-05-24 RX ORDER — NITROFURANTOIN 25; 75 MG/1; MG/1
100 CAPSULE ORAL 2 TIMES DAILY
Qty: 10 CAPSULE | Refills: 0 | Status: SHIPPED | OUTPATIENT
Start: 2023-05-24 | End: 2023-12-21

## 2023-05-24 RX ORDER — SUMATRIPTAN 100 MG/1
TABLET, FILM COATED ORAL
Qty: 18 TABLET | Refills: 11 | Status: SHIPPED | OUTPATIENT
Start: 2023-05-24 | End: 2024-05-29

## 2023-05-24 RX ORDER — CITALOPRAM HYDROBROMIDE 20 MG/1
20 TABLET ORAL DAILY
Qty: 90 TABLET | Refills: 3 | Status: SHIPPED | OUTPATIENT
Start: 2023-05-24 | End: 2024-05-29

## 2023-05-24 ASSESSMENT — ENCOUNTER SYMPTOMS
HEMATURIA: 0
DIZZINESS: 0
WEAKNESS: 0
ABDOMINAL PAIN: 0
NAUSEA: 0
HEARTBURN: 0
PARESTHESIAS: 0
CONSTIPATION: 0
JOINT SWELLING: 0
SORE THROAT: 0
SHORTNESS OF BREATH: 0
COUGH: 0
HEADACHES: 0
ARTHRALGIAS: 0
FEVER: 0
DYSURIA: 0
HEMATOCHEZIA: 0
FREQUENCY: 0
BREAST MASS: 0
PALPITATIONS: 0
EYE PAIN: 0
MYALGIAS: 0
DIARRHEA: 0
CHILLS: 0
NERVOUS/ANXIOUS: 0

## 2023-05-24 ASSESSMENT — PAIN SCALES - GENERAL: PAINLEVEL: NO PAIN (0)

## 2023-05-24 NOTE — PROGRESS NOTES
SUBJECTIVE:   CC: Lou is an 38 year old who presents for preventive health visit.   Patient has been advised of split billing requirements and indicates understanding: Yes     Healthy Habits:     Getting at least 3 servings of Calcium per day:  Yes    Bi-annual eye exam:  Yes    Dental care twice a year:  Yes    Sleep apnea or symptoms of sleep apnea:  None    Diet:  Regular (no restrictions)    Frequency of exercise:  2-3 days/week    Duration of exercise:  15-30 minutes    Taking medications regularly:  Yes    Medication side effects:  Not applicable    PHQ-2 Total Score: 2    Additional concerns today:  No        Social History     Tobacco Use     Smoking status: Never     Smokeless tobacco: Never   Vaping Use     Vaping status: Never Used   Substance Use Topics     Alcohol use: Not Currently     Comment: once or twice q 3-4 months           5/23/2023     8:41 AM   Alcohol Use   Prescreen: >3 drinks/day or >7 drinks/week? No     Reviewed orders with patient.  Reviewed health maintenance and updated orders accordingly - Yes  Lab work is in process  Labs reviewed in EPIC  BP Readings from Last 3 Encounters:   05/24/23 100/70   01/19/23 120/79   04/27/22 120/76    Wt Readings from Last 3 Encounters:   05/24/23 89 kg (196 lb 3.2 oz)   04/27/22 83.5 kg (184 lb)   12/16/20 87 kg (191 lb 12.8 oz)                    Breast Cancer Screening:    FHS-7:       4/27/2022     6:54 AM   Breast CA Risk Assessment (FHS-7)   Did any of your first-degree relatives have breast or ovarian cancer? No   Did any of your relatives have bilateral breast cancer? No   Did any man in your family have breast cancer? No   Did any woman in your family have breast and ovarian cancer? Yes   Did any woman in your family have breast cancer before age 50 y? Unknown   Do you have 2 or more relatives with breast and/or ovarian cancer? No   Do you have 2 or more relatives with breast and/or bowel cancer? Yes       Patient under 40 years of age:  Routine Mammogram Screening not recommended.   Pertinent mammograms are reviewed under the imaging tab.    History of abnormal Pap smear: NO - age 30- 65 PAP every 3 years recommended      Latest Ref Rng & Units 12/31/2019     1:58 PM 12/31/2019     1:55 PM 11/17/2016     7:09 PM   PAP / HPV   PAP (Historical)  NIL       HPV 16 DNA NEG^Negative  Negative   Negative     HPV 18 DNA NEG^Negative  Negative   Negative     Other HR HPV NEG^Negative  Negative   Negative       Reviewed and updated as needed this visit by clinical staff   Tobacco  Allergies  Meds              Reviewed and updated as needed this visit by Provider                   Depression/anxiety - restarted citalopram and it has helped    Exhausted - feels tired all the time.  Sleeping ok - about 8 hours.  Would love to sleep all the time.  Stamina is normal.   No snoring or concerns for sleep apnea - just wiped out.    Migraine - haven't been problematic - well controlled.  Using imitrex intermittently    Family hx colon cancer - many polyps on her father's colonoscopies.      Moles - sees Dr Charles, no acute concerns    IUD - Ob/Gyn reviewed date and ok - spotty periods    Breast tenderness - left upper outer quadrant - present for months, come and goes, no LAD, no nipple discharge    October - going on trip for 9 days with  - [place] - tends to get urinary tract infections with increased sexual activity - would like antibiotics to have with her just in case        Review of Systems   Constitutional: Negative for chills and fever.   HENT: Negative for congestion, ear pain, hearing loss and sore throat.    Eyes: Negative for pain and visual disturbance.   Respiratory: Negative for cough and shortness of breath.    Cardiovascular: Negative for chest pain, palpitations and peripheral edema.   Gastrointestinal: Negative for abdominal pain, constipation, diarrhea, heartburn, hematochezia and nausea.   Breasts:  Positive for tenderness. Negative for  "breast mass and discharge.   Genitourinary: Negative for dysuria, frequency, genital sores, hematuria, pelvic pain, urgency, vaginal bleeding and vaginal discharge.   Musculoskeletal: Negative for arthralgias, joint swelling and myalgias.   Skin: Negative for rash.   Neurological: Negative for dizziness, weakness, headaches and paresthesias.   Psychiatric/Behavioral: Negative for mood changes. The patient is not nervous/anxious.           OBJECTIVE:   /70 (BP Location: Right arm, Patient Position: Sitting, Cuff Size: Adult Regular)   Pulse 78   Temp 98.4  F (36.9  C) (Tympanic)   Resp 16   Ht 1.66 m (5' 5.35\")   Wt 89 kg (196 lb 3.2 oz)   LMP  (LMP Unknown)   SpO2 97%   BMI 32.30 kg/m     Wt Readings from Last 4 Encounters:   05/24/23 89 kg (196 lb 3.2 oz)   04/27/22 83.5 kg (184 lb)   12/16/20 87 kg (191 lb 12.8 oz)   12/31/19 88.8 kg (195 lb 12.8 oz)       Physical Exam  GENERAL: healthy, alert and no distress  EYES: Eyes grossly normal to inspection, PERRL and conjunctivae and sclerae normal  HENT: ear canals and TM's normal, nose and mouth without ulcers or lesions  NECK: no adenopathy, no asymmetry, masses, or scars and thyroid normal to palpation  RESP: lungs clear to auscultation - no rales, rhonchi or wheezes  BREAST: right side -  normal without masses, tenderness or nipple discharge and no palpable axillary masses or adenopathy, left side with more fullness in upper outer quadrant that is tender to touch, no nipple discharge or axillary LAD, no overlying skin changes  CV: regular rate and rhythm, normal S1 S2, no S3 or S4, no murmur, click or rub, no peripheral edema and peripheral pulses strong  ABDOMEN: soft, nontender, no hepatosplenomegaly, no masses and bowel sounds normal   (female): normal female external genitalia, normal urethral meatus, vaginal mucosa pink, moist, well rugated, and normal cervix/adnexa/uterus without masses or discharge, IUD strings visible  MS: no gross " musculoskeletal defects noted, no edema  SKIN: no suspicious lesions or rashes  NEURO: Normal strength and tone, mentation intact and speech normal  PSYCH: mentation appears normal, affect normal/bright    Diagnostic Test Results:  pending    ASSESSMENT/PLAN:       ICD-10-CM    1. Routine general medical examination at a health care facility  Z00.00 Comprehensive metabolic panel (BMP + Alb, Alk Phos, ALT, AST, Total. Bili, TP)     Lipid panel reflex to direct LDL Fasting      2. Mild recurrent major depression (H)  F33.0 citalopram (CELEXA) 20 MG tablet  Under adequate control - continue at current dosing - also recommended counseling and patient to alert me if needing a referral      3. RAMÓN (generalized anxiety disorder)  F41.1 citalopram (CELEXA) 20 MG tablet  See above      4. Seasonal affective disorder (H)  F33.8 citalopram (CELEXA) 20 MG tablet  See above      5. Mass of upper outer quadrant of left breast  N63.21 US Breast Left Complete 4 Quadrants     MA Diagnostic Digital Bilateral    Recommended diagnostic imaging      6. Migraine without aura and without status migrainosus, not intractable  G43.009 SUMAtriptan (IMITREX) 100 MG tablet    Using imitrex rarely      7. Multiple pigmented nevi  D22.9 Following with Dr Charles      8. Cervical cancer screening  Z12.4 Pap screen with HPV - recommended age 30 - 65 years      9. IUD (intrauterine device) in place  Z97.5 Follows with [facility]      10. Special screening for malignant neoplasms, colon  Z12.11 Colonoscopy Screening  Referral    Father has had 20+ polyps      11. Chronic fatigue  R53.82 Comprehensive metabolic panel (BMP + Alb, Alk Phos, ALT, AST, Total. Bili, TP)     Lipid panel reflex to direct LDL Fasting     TSH with free T4 reflex     CBC with platelets     Ferritin     Vitamin D Deficiency    Plan additional lab work for fatigue      12. Acute UTI (urinary tract infection)  N39.0 nitroFURantoin macrocrystal-monohydrate (MACROBID)  100 MG capsule    For international travel      13. Family history of colon cancer  Z80.0 Colonoscopy Screening  Referral              COUNSELING:  Reviewed preventive health counseling, as reflected in patient instructions        She reports that she has never smoked. She has never used smokeless tobacco.      Rita Arreguin MD  [facility]  Answers for HPI/ROS submitted by the patient on 5/23/2023  If you checked off any problems, how difficult have these problems made it for you to do your work, take care of things at home, or get along with other people?: Somewhat difficult  PHQ9 TOTAL SCORE: 9

## 2023-05-24 NOTE — PATIENT INSTRUCTIONS
Lab work today    Expect a call for your colonoscopy    Abx for your trip - have a blast    Expect a call to schedule your mammo and breast US        Preventive Health Recommendations  Female Ages 26 - 39  Yearly exam:   See your health care provider every year in order to  Review health changes.   Discuss preventive care.    Review your medicines if you your doctor has prescribed any.    Until age 30: Get a Pap test every three years (more often if you have had an abnormal result).    After age 30: Talk to your doctor about whether you should have a Pap test every 3 years or have a Pap test with HPV screening every 5 years.   You do not need a Pap test if your uterus was removed (hysterectomy) and you have not had cancer.  You should be tested each year for STDs (sexually transmitted diseases), if you're at risk.   Talk to your provider about how often to have your cholesterol checked.  If you are at risk for diabetes, you should have a diabetes test (fasting glucose).  Shots: Get a flu shot each year. Get a tetanus shot every 10 years.   Nutrition:   Eat at least 5 servings of fruits and vegetables each day.  Eat whole-grain bread, whole-wheat pasta and brown rice instead of white grains and rice.  Get adequate Calcium and Vitamin D.     Lifestyle  Exercise at least 150 minutes a week (30 minutes a day, 5 days of the week). This will help you control your weight and prevent disease.  Limit alcohol to one drink per day.  No smoking.   Wear sunscreen to prevent skin cancer.  See your dentist every six months for an exam and cleaning.

## 2023-05-25 LAB — CRP SERPL-MCNC: <3 MG/L

## 2023-05-26 LAB
IRON BINDING CAPACITY (ROCHE): 261 UG/DL (ref 240–430)
IRON SATN MFR SERPL: 71 % (ref 15–46)
IRON SERPL-MCNC: 185 UG/DL (ref 37–145)

## 2023-05-27 LAB
BKR LAB AP GYN ADEQUACY: NORMAL
BKR LAB AP GYN INTERPRETATION: NORMAL
BKR LAB AP HPV REFLEX: NORMAL
BKR LAB AP PREVIOUS ABNORMAL: NORMAL
PATH REPORT.COMMENTS IMP SPEC: NORMAL
PATH REPORT.COMMENTS IMP SPEC: NORMAL
PATH REPORT.RELEVANT HX SPEC: NORMAL

## 2023-05-29 ENCOUNTER — MYC MEDICAL ADVICE (OUTPATIENT)
Dept: PEDIATRICS | Facility: CLINIC | Age: 38
End: 2023-05-29
Payer: COMMERCIAL

## 2023-05-29 DIAGNOSIS — E83.19 IRON OVERLOAD: Primary | ICD-10-CM

## 2023-05-30 LAB
HUMAN PAPILLOMA VIRUS 16 DNA: NEGATIVE
HUMAN PAPILLOMA VIRUS 18 DNA: NEGATIVE
HUMAN PAPILLOMA VIRUS FINAL DIAGNOSIS: NORMAL
HUMAN PAPILLOMA VIRUS OTHER HR: NEGATIVE

## 2023-06-01 ENCOUNTER — PATIENT OUTREACH (OUTPATIENT)
Dept: ONCOLOGY | Facility: CLINIC | Age: 38
End: 2023-06-01
Payer: COMMERCIAL

## 2023-06-01 ENCOUNTER — HOSPITAL ENCOUNTER (OUTPATIENT)
Dept: MAMMOGRAPHY | Facility: CLINIC | Age: 38
Discharge: HOME | End: 2023-06-01
Attending: PEDIATRICS
Payer: COMMERCIAL

## 2023-06-01 DIAGNOSIS — N63.21 MASS OF UPPER OUTER QUADRANT OF LEFT BREAST: ICD-10-CM

## 2023-06-01 PROCEDURE — 77062 BREAST TOMOSYNTHESIS BI: CPT

## 2023-06-01 PROCEDURE — 76642 ULTRASOUND BREAST LIMITED: CPT | Mod: LT

## 2023-06-01 NOTE — PROGRESS NOTES
Referral received for benign heme services, see below.    Referral reason: elevated iron    Current abnormal labs: Available in Chart Review    Outreach: Kathyhart sent to patient    Plan: Triage instructions updated and sent to [facility]PS for completion.

## 2023-06-05 NOTE — TELEPHONE ENCOUNTER
RECORDS STATUS - ALL OTHER DIAGNOSIS      RECORDS RECEIVED FROM:    DATE RECEIVED: 6/9/2023   NOTES STATUS DETAILS   OFFICE NOTE from referring provider Complete Rita Darby MD   DISCHARGE SUMMARY from hospital     DISCHARGE REPORT from the ER     OPERATIVE REPORT     MEDICATION LIST Complete [facility]   CLINICAL TRIAL TREATMENTS TO DATE     LABS     PATHOLOGY REPORTS     ANYTHING RELATED TO DIAGNOSIS Complete Labs last updated on 5/24/2023    GENONOMIC TESTING     TYPE:     IMAGING (NEED IMAGES & REPORT)     CT SCANS     MRI     MAMMO     ULTRASOUND     PET

## 2023-06-09 ENCOUNTER — VIRTUAL VISIT (OUTPATIENT)
Dept: ONCOLOGY | Facility: CLINIC | Age: 38
End: 2023-06-09
Attending: INTERNAL MEDICINE
Payer: COMMERCIAL

## 2023-06-09 ENCOUNTER — PRE VISIT (OUTPATIENT)
Dept: ONCOLOGY | Facility: CLINIC | Age: 38
End: 2023-06-09
Payer: COMMERCIAL

## 2023-06-09 DIAGNOSIS — R79.0 ABNORMAL RESULT OF IRON PROFILE TESTING: Primary | ICD-10-CM

## 2023-06-09 DIAGNOSIS — E83.19 IRON OVERLOAD: ICD-10-CM

## 2023-06-09 PROCEDURE — 99203 OFFICE O/P NEW LOW 30 MIN: CPT | Mod: VID | Performed by: INTERNAL MEDICINE

## 2023-06-09 ASSESSMENT — ENCOUNTER SYMPTOMS
SLEEP DISTURBANCE: 1
RESPIRATORY NEGATIVE: 1
HEMATOLOGIC/LYMPHATIC NEGATIVE: 1
EYES NEGATIVE: 1
CARDIOVASCULAR NEGATIVE: 1
DIZZINESS: 1
CONSTITUTIONAL NEGATIVE: 1
MUSCULOSKELETAL NEGATIVE: 1
NAUSEA: 1
ENDOCRINE NEGATIVE: 1

## 2023-06-09 NOTE — LETTER
6/9/2023         RE: Lou Griffith  [address] [place] [address]        Dear Colleague,    Thank you for referring your patient, Lou Griffith, to the [facility]. Please see a copy of my visit note below.    Virtual Visit Details    Type of service:  Video Visit     Originating Location (pt. Location): Home  Distant Location (provider location):  Off-site  Platform used for Video Visit: Francisca Londono CMA on 6/9/2023 at 1:40 PM      Assessment / Plan  Elevated iron chemistries    Labs and testing: repeat CBC, CMP, Ferritin, Fe / IBC, hemochromatosis screen  Next provider visit:   HFE positive, then see back (virtual is OK) to discuss and establish phlebotomy program, otherwise would not need hematology follow up.    History  This is an initial hematology consultation visit to address abnormal iron profile.  The patient saw her PCP with complaints of unexpected fatigue for many months. Iron was check and was unexpectedly elevated.  The patient does not take iron supplements, drinks infrequently, and has no history of liver problems.      She has family members with colon cancer and polyps and others with iron deficiency anemia.  No family member has iron overload.    Because of concerning family history, she's getting set up for screening colonoscopy.    She has an IUD in place.  Periods are scan.    ECOG = 0    Patient Active Problem List   Diagnosis     Health Care Home     CARDIOVASCULAR SCREENING; LDL GOAL LESS THAN 160     Headache     Anxiety state     Major depressive disorder, single episode, mild (H)     Congenital vesico-ureteric reflux     RAMÓN (generalized anxiety disorder)     Seasonal affective disorder (H)     Migraine without aura and without status migrainosus, not intractable     Multiple pigmented nevi     Current Outpatient Medications   Medication     citalopram (CELEXA) 20 MG tablet     levonorgestrel (MIRENA) 20 MCG/24HR IUD      multivitamin w/minerals (THERA-VIT-M) tablet     nitroFURantoin macrocrystal-monohydrate (MACROBID) 100 MG capsule     SUMAtriptan (IMITREX) 100 MG tablet     vitamin D3 (CHOLECALCIFEROL) 1.25 MG (52248 UT) capsule     No current facility-administered medications for this visit.     Past Medical History:   Diagnosis Date     ASCUS favor benign 06/2014    Co-test 3 yrs     Depressive disorder     post-partum     Headache      Microhematuria     pt reports longstanding      Vesico-ureteral reflux age 10     s/p surgery      Past Surgical History:   Procedure Laterality Date     BLADDER SURGERY       urinary reflux surgery  age 10     Socioeconomic History     Marital status:      Highest education level: Associate degree: occupational, technical, or vocational program     Social Determinants of Health     Social Connections: Moderately Integrated (5/23/2023)    Social Connection and Isolation Panel [NHANES]      Frequency of Communication with Friends and Family: More than three times a week      Frequency of Social Gatherings with Friends and Family: Twice a week      Attends Protestant Services: 1 to 4 times per year      Active Member of Clubs or Organizations: No      Marital Status:      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    HENT:  Negative.    Eyes: Negative.    Respiratory: Negative.    Cardiovascular: Negative.    Gastrointestinal: Positive for nausea (Occasional, middle of the night).   Endocrine: Negative.    Genitourinary: Negative.     Musculoskeletal: Negative.    Skin: Negative.    Neurological: Positive for dizziness.   Hematological: Negative.    Psychiatric/Behavioral: Positive for sleep disturbance.   All other systems reviewed and are negative.      LMP  (LMP Unknown)     GENERAL: Healthy, alert and no distress  EYES: Eyes grossly normal to inspection.  No discharge or erythema, or obvious scleral/conjunctival abnormalities.  RESP:  No audible wheeze, cough, or visible cyanosis.  No visible retractions or increased work of breathing.    SKIN: Visible skin clear. No significant rash, abnormal pigmentation or lesions.  NEURO: Cranial nerves grossly intact.  Mentation and speech appropriate for age.  PSYCH: Mentation appears normal, affect normal/bright, judgement and insight intact, normal speech and appearance well-groomed.    Recent Results (from the past 720 hour(s))   Pap screen with HPV - recommended age 30 - 65 years    Collection Time: 05/24/23  7:57 AM   Result Value Ref Range    Interpretation        Negative for Intraepithelial Lesion or Malignancy (NILM)    Comment         Papanicolaou Test Limitations:  Cervical cytology is a screening test with limited sensitivity, and regular screening is critical for cancer prevention.  Pap tests are primarily effective for the diagnosis/prevention of squamous cell carcinoma, not adenocarcinoma or other cancers.        Specimen Adequacy       Satisfactory for evaluation, endocervical/transformation zone component absent    Clinical Information       none      Reflex Testing Yes regardless of result     Previous Abnormal?       No      Performing Labs       The technical component of this testing was completed at [facility]     HPV High Risk Types DNA Cervical    Collection Time: 05/24/23  7:57 AM   Result Value Ref Range    Other HR HPV Negative Negative    HPV16 DNA Negative Negative    HPV18 DNA Negative Negative    FINAL DIAGNOSIS       This patient's sample is negative for HPV DNA.        This test was developed and its performance characteristics determined by the [facility], [facility]. It has not been cleared or approved by the FDA. The laboratory is regulated under CLIA as qualified to perform high-complexity testing. This test is used for clinical purposes. It should not be  regarded as investigational or for research.    METHODOLOGY: The Roche Tushar 4800 system uses automated extraction, simultaneous amplification of HPV (L1 region) and beta-globin, followed by real time detection of fluorescent labeled HPV and beta globin using specific oligonucleotide probes. The test specifically identifies types HPV 16 DNA and HPV 18 DNA while concurrently detecting the rest of the high risk types (31, 33, 35, 39, 45, 51, 52, 56, 58, 59, 66 or 68).    COMMENTS: This test is not intended for use as a screening device for woman under age 30 with normal cervical cytology. Results should be correlated with cytologic and histologic findings. Close clinical followup is recommended.       Comprehensive metabolic panel (BMP + Alb, Alk Phos, ALT, AST, Total. Bili, TP)    Collection Time: 05/24/23  8:14 AM   Result Value Ref Range    Sodium 139 136 - 145 mmol/L    Potassium 3.8 3.4 - 5.3 mmol/L    Chloride 106 98 - 107 mmol/L    Carbon Dioxide (CO2) 22 22 - 29 mmol/L    Anion Gap 11 7 - 15 mmol/L    Urea Nitrogen 15.3 6.0 - 20.0 mg/dL    Creatinine 0.92 0.51 - 0.95 mg/dL    Calcium 9.3 8.6 - 10.0 mg/dL    Glucose 101 (H) 70 - 99 mg/dL    Alkaline Phosphatase 48 35 - 104 U/L    AST 28 10 - 35 U/L    ALT 31 10 - 35 U/L    Protein Total 7.0 6.4 - 8.3 g/dL    Albumin 4.7 3.5 - 5.2 g/dL    Bilirubin Total 0.9 <=1.2 mg/dL    GFR Estimate 82 >60 mL/min/1.73m2   Lipid panel reflex to direct LDL Fasting    Collection Time: 05/24/23  8:14 AM   Result Value Ref Range    Cholesterol 201 (H) <200 mg/dL    Triglycerides 96 <150 mg/dL    Direct Measure HDL 47 (L) >=50 mg/dL    LDL Cholesterol Calculated 135 (H) <=100 mg/dL    Non HDL Cholesterol 154 (H) <130 mg/dL   TSH with free T4 reflex    Collection Time: 05/24/23  8:14 AM   Result Value Ref Range    TSH 1.62 0.30 - 4.20 uIU/mL   CBC with platelets    Collection Time: 05/24/23  8:14 AM   Result Value Ref Range    WBC Count 6.0 4.0 - 11.0 10e3/uL    RBC Count 4.73 3.80  - 5.20 10e6/uL    Hemoglobin 15.0 11.7 - 15.7 g/dL    Hematocrit 41.3 35.0 - 47.0 %    MCV 87 78 - 100 fL    MCH 31.7 26.5 - 33.0 pg    MCHC 36.3 31.5 - 36.5 g/dL    RDW 11.6 10.0 - 15.0 %    Platelet Count 267 150 - 450 10e3/uL   Ferritin    Collection Time: 05/24/23  8:14 AM   Result Value Ref Range    Ferritin 376 (H) 6 - 175 ng/mL   Vitamin D Deficiency    Collection Time: 05/24/23  8:14 AM   Result Value Ref Range    Vitamin D, Total (25-Hydroxy) 17 (L) 20 - 75 ug/L   CRP, inflammation    Collection Time: 05/24/23  8:14 AM   Result Value Ref Range    CRP Inflammation <3.00 <5.00 mg/L   Iron and iron binding capacity    Collection Time: 05/24/23  8:14 AM   Result Value Ref Range    Iron 185 (H) 37 - 145 ug/dL    Iron Binding Capacity 261 240 - 430 ug/dL    Iron Sat Index 71 (H) 15 - 46 %     Recent Results (from the past 744 hour(s))   MA Diagnostic Bilateral w/Lazaro    Narrative    DIAGNOSTIC MAMMOGRAM, BILATERAL, DIGITAL w/CAD AND TOMOSYNTHESIS -  6/1/2023 2:18 PM  ULTRASOUND LEFT BREAST    HISTORY:  Left breast lump.     COMPARISON:  None. Baseline.     BREAST DENSITY: Heterogeneously dense.    FINDINGS:  No mammographic findings suspicious for malignancy.    Targeted ultrasound evaluation of the left breast from 12:00 to 3:00 5  cm from the nipple at the site of palpable lump and pain demonstrates  no underlying sonographic abnormalities.       Impression    IMPRESSION: BI-RADS CATEGORY: 1 - NEGATIVE.    RECOMMENDED FOLLOW-UP: Annual Mammography, clinical follow-up.  Recommend routine annual screening mammography. Clinical follow-up is  recommended, with management dependent on the results/findings of the  physical examination.    The results and recommendation were communicated to the patient at the  conclusion of today's appointment.    CANDI ALMAGUER MD         SYSTEM ID:  [id number]     US Breast Left    Narrative    DIAGNOSTIC MAMMOGRAM, BILATERAL, DIGITAL w/CAD AND TOMOSYNTHESIS -  6/1/2023 2:18  PM  ULTRASOUND LEFT BREAST    HISTORY:  Left breast lump.     COMPARISON:  None. Baseline.     BREAST DENSITY: Heterogeneously dense.    FINDINGS:  No mammographic findings suspicious for malignancy.    Targeted ultrasound evaluation of the left breast from 12:00 to 3:00 5  cm from the nipple at the site of palpable lump and pain demonstrates  no underlying sonographic abnormalities.       Impression    IMPRESSION: BI-RADS CATEGORY: 1 - NEGATIVE.    RECOMMENDED FOLLOW-UP: Annual Mammography, clinical follow-up.  Recommend routine annual screening mammography. Clinical follow-up is  recommended, with management dependent on the results/findings of the  physical examination.    The results and recommendation were communicated to the patient at the  conclusion of today's appointment.    CANDI ALMAGUER MD         SYSTEM ID:  [id number]         Medical decision Making    Reviewed Labs    Reviewed new imaging results    Notes from other providers and nursing staff  Total time for review of records, interview and examination, documentation = 35 mn    Virtual Visit    Platform:  ViewCast    Patient Location: OFF SITE - HOME    Provider Location: OFF SITE - HOME OFFICE    Visit Start Time: 2 PM    Visit End Time: 2:59 PM          Again, thank you for allowing me to participate in the care of your patient.        Sincerely,        Rachele Ortiz MD

## 2023-06-09 NOTE — LETTER
6/9/2023         RE: Lou Griffith  [address] [place] [address]        Dear Colleague,    Thank you for referring your patient, Lou Griffith, to the [facility]. Please see a copy of my visit note below.    Virtual Visit Details    Type of service:  Video Visit     Originating Location (pt. Location): Home  Distant Location (provider location):  Off-site  Platform used for Video Visit: Francisca Londono CMA on 6/9/2023 at 1:40 PM      Assessment / Plan  Elevated iron chemistries    Labs and testing: repeat CBC, CMP, Ferritin, Fe / IBC, hemochromatosis screen  Next provider visit:   HFE positive, then see back (virtual is OK) to discuss and establish phlebotomy program, otherwise would not need hematology follow up.    History  This is an initial hematology consultation visit to address abnormal iron profile.  The patient saw her PCP with complaints of unexpected fatigue for many months. Iron was check and was unexpectedly elevated.  The patient does not take iron supplements, drinks infrequently, and has no history of liver problems.      She has family members with colon cancer and polyps and others with iron deficiency anemia.  No family member has iron overload.    Because of concerning family history, she's getting set up for screening colonoscopy.    She has an IUD in place.  Periods are scan.    ECOG = 0    Patient Active Problem List   Diagnosis     Health Care Home     CARDIOVASCULAR SCREENING; LDL GOAL LESS THAN 160     Headache     Anxiety state     Major depressive disorder, single episode, mild (H)     Congenital vesico-ureteric reflux     RAMÓN (generalized anxiety disorder)     Seasonal affective disorder (H)     Migraine without aura and without status migrainosus, not intractable     Multiple pigmented nevi     Current Outpatient Medications   Medication     citalopram (CELEXA) 20 MG tablet     levonorgestrel (MIRENA) 20 MCG/24HR IUD      multivitamin w/minerals (THERA-VIT-M) tablet     nitroFURantoin macrocrystal-monohydrate (MACROBID) 100 MG capsule     SUMAtriptan (IMITREX) 100 MG tablet     vitamin D3 (CHOLECALCIFEROL) 1.25 MG (29792 UT) capsule     No current facility-administered medications for this visit.     Past Medical History:   Diagnosis Date     ASCUS favor benign 06/2014    Co-test 3 yrs     Depressive disorder     post-partum     Headache      Microhematuria     pt reports longstanding      Vesico-ureteral reflux age 10     s/p surgery      Past Surgical History:   Procedure Laterality Date     BLADDER SURGERY       urinary reflux surgery  age 10     Socioeconomic History     Marital status:      Highest education level: Associate degree: occupational, technical, or vocational program     Social Determinants of Health     Social Connections: Moderately Integrated (5/23/2023)    Social Connection and Isolation Panel [NHANES]      Frequency of Communication with Friends and Family: More than three times a week      Frequency of Social Gatherings with Friends and Family: Twice a week      Attends Holiness Services: 1 to 4 times per year      Active Member of Clubs or Organizations: No      Marital Status:      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    HENT:  Negative.    Eyes: Negative.    Respiratory: Negative.    Cardiovascular: Negative.    Gastrointestinal: Positive for nausea (Occasional, middle of the night).   Endocrine: Negative.    Genitourinary: Negative.     Musculoskeletal: Negative.    Skin: Negative.    Neurological: Positive for dizziness.   Hematological: Negative.    Psychiatric/Behavioral: Positive for sleep disturbance.   All other systems reviewed and are negative.      LMP  (LMP Unknown)     GENERAL: Healthy, alert and no distress  EYES: Eyes grossly normal to inspection.  No discharge or erythema, or obvious scleral/conjunctival abnormalities.  RESP:  No audible wheeze, cough, or visible cyanosis.  No visible retractions or increased work of breathing.    SKIN: Visible skin clear. No significant rash, abnormal pigmentation or lesions.  NEURO: Cranial nerves grossly intact.  Mentation and speech appropriate for age.  PSYCH: Mentation appears normal, affect normal/bright, judgement and insight intact, normal speech and appearance well-groomed.    Recent Results (from the past 720 hour(s))   Pap screen with HPV - recommended age 30 - 65 years    Collection Time: 05/24/23  7:57 AM   Result Value Ref Range    Interpretation        Negative for Intraepithelial Lesion or Malignancy (NILM)    Comment         Papanicolaou Test Limitations:  Cervical cytology is a screening test with limited sensitivity, and regular screening is critical for cancer prevention.  Pap tests are primarily effective for the diagnosis/prevention of squamous cell carcinoma, not adenocarcinoma or other cancers.        Specimen Adequacy       Satisfactory for evaluation, endocervical/transformation zone component absent    Clinical Information       none      Reflex Testing Yes regardless of result     Previous Abnormal?       No      Performing Labs       The technical component of this testing was completed at [facility]     HPV High Risk Types DNA Cervical    Collection Time: 05/24/23  7:57 AM   Result Value Ref Range    Other HR HPV Negative Negative    HPV16 DNA Negative Negative    HPV18 DNA Negative Negative    FINAL DIAGNOSIS       This patient's sample is negative for HPV DNA.        This test was developed and its performance characteristics determined by the [facility], [facility]. It has not been cleared or approved by the FDA. The laboratory is regulated under CLIA as qualified to perform high-complexity testing. This test is used for clinical purposes. It should not be  regarded as investigational or for research.    METHODOLOGY: The Roche Tushar 4800 system uses automated extraction, simultaneous amplification of HPV (L1 region) and beta-globin, followed by real time detection of fluorescent labeled HPV and beta globin using specific oligonucleotide probes. The test specifically identifies types HPV 16 DNA and HPV 18 DNA while concurrently detecting the rest of the high risk types (31, 33, 35, 39, 45, 51, 52, 56, 58, 59, 66 or 68).    COMMENTS: This test is not intended for use as a screening device for woman under age 30 with normal cervical cytology. Results should be correlated with cytologic and histologic findings. Close clinical followup is recommended.       Comprehensive metabolic panel (BMP + Alb, Alk Phos, ALT, AST, Total. Bili, TP)    Collection Time: 05/24/23  8:14 AM   Result Value Ref Range    Sodium 139 136 - 145 mmol/L    Potassium 3.8 3.4 - 5.3 mmol/L    Chloride 106 98 - 107 mmol/L    Carbon Dioxide (CO2) 22 22 - 29 mmol/L    Anion Gap 11 7 - 15 mmol/L    Urea Nitrogen 15.3 6.0 - 20.0 mg/dL    Creatinine 0.92 0.51 - 0.95 mg/dL    Calcium 9.3 8.6 - 10.0 mg/dL    Glucose 101 (H) 70 - 99 mg/dL    Alkaline Phosphatase 48 35 - 104 U/L    AST 28 10 - 35 U/L    ALT 31 10 - 35 U/L    Protein Total 7.0 6.4 - 8.3 g/dL    Albumin 4.7 3.5 - 5.2 g/dL    Bilirubin Total 0.9 <=1.2 mg/dL    GFR Estimate 82 >60 mL/min/1.73m2   Lipid panel reflex to direct LDL Fasting    Collection Time: 05/24/23  8:14 AM   Result Value Ref Range    Cholesterol 201 (H) <200 mg/dL    Triglycerides 96 <150 mg/dL    Direct Measure HDL 47 (L) >=50 mg/dL    LDL Cholesterol Calculated 135 (H) <=100 mg/dL    Non HDL Cholesterol 154 (H) <130 mg/dL   TSH with free T4 reflex    Collection Time: 05/24/23  8:14 AM   Result Value Ref Range    TSH 1.62 0.30 - 4.20 uIU/mL   CBC with platelets    Collection Time: 05/24/23  8:14 AM   Result Value Ref Range    WBC Count 6.0 4.0 - 11.0 10e3/uL    RBC Count 4.73 3.80  - 5.20 10e6/uL    Hemoglobin 15.0 11.7 - 15.7 g/dL    Hematocrit 41.3 35.0 - 47.0 %    MCV 87 78 - 100 fL    MCH 31.7 26.5 - 33.0 pg    MCHC 36.3 31.5 - 36.5 g/dL    RDW 11.6 10.0 - 15.0 %    Platelet Count 267 150 - 450 10e3/uL   Ferritin    Collection Time: 05/24/23  8:14 AM   Result Value Ref Range    Ferritin 376 (H) 6 - 175 ng/mL   Vitamin D Deficiency    Collection Time: 05/24/23  8:14 AM   Result Value Ref Range    Vitamin D, Total (25-Hydroxy) 17 (L) 20 - 75 ug/L   CRP, inflammation    Collection Time: 05/24/23  8:14 AM   Result Value Ref Range    CRP Inflammation <3.00 <5.00 mg/L   Iron and iron binding capacity    Collection Time: 05/24/23  8:14 AM   Result Value Ref Range    Iron 185 (H) 37 - 145 ug/dL    Iron Binding Capacity 261 240 - 430 ug/dL    Iron Sat Index 71 (H) 15 - 46 %     Recent Results (from the past 744 hour(s))   MA Diagnostic Bilateral w/Lazaro    Narrative    DIAGNOSTIC MAMMOGRAM, BILATERAL, DIGITAL w/CAD AND TOMOSYNTHESIS -  6/1/2023 2:18 PM  ULTRASOUND LEFT BREAST    HISTORY:  Left breast lump.     COMPARISON:  None. Baseline.     BREAST DENSITY: Heterogeneously dense.    FINDINGS:  No mammographic findings suspicious for malignancy.    Targeted ultrasound evaluation of the left breast from 12:00 to 3:00 5  cm from the nipple at the site of palpable lump and pain demonstrates  no underlying sonographic abnormalities.       Impression    IMPRESSION: BI-RADS CATEGORY: 1 - NEGATIVE.    RECOMMENDED FOLLOW-UP: Annual Mammography, clinical follow-up.  Recommend routine annual screening mammography. Clinical follow-up is  recommended, with management dependent on the results/findings of the  physical examination.    The results and recommendation were communicated to the patient at the  conclusion of today's appointment.    CANDI ALMAGUER MD         SYSTEM ID:  [id number]     US Breast Left    Narrative    DIAGNOSTIC MAMMOGRAM, BILATERAL, DIGITAL w/CAD AND TOMOSYNTHESIS -  6/1/2023 2:18  PM  ULTRASOUND LEFT BREAST    HISTORY:  Left breast lump.     COMPARISON:  None. Baseline.     BREAST DENSITY: Heterogeneously dense.    FINDINGS:  No mammographic findings suspicious for malignancy.    Targeted ultrasound evaluation of the left breast from 12:00 to 3:00 5  cm from the nipple at the site of palpable lump and pain demonstrates  no underlying sonographic abnormalities.       Impression    IMPRESSION: BI-RADS CATEGORY: 1 - NEGATIVE.    RECOMMENDED FOLLOW-UP: Annual Mammography, clinical follow-up.  Recommend routine annual screening mammography. Clinical follow-up is  recommended, with management dependent on the results/findings of the  physical examination.    The results and recommendation were communicated to the patient at the  conclusion of today's appointment.    CANDI ALMAGUER MD         SYSTEM ID:  [id number]         Medical decision Making    Reviewed Labs    Reviewed new imaging results    Notes from other providers and nursing staff  Total time for review of records, interview and examination, documentation = 35 mn    Virtual Visit    Platform:  [employer]    Patient Location: OFF SITE - HOME    Provider Location: OFF SITE - HOME OFFICE    Visit Start Time: 2 PM    Visit End Time: 2:59 PM          Again, thank you for allowing me to participate in the care of your patient.        Sincerely,        Rachele Ortiz MD

## 2023-06-09 NOTE — PROGRESS NOTES
Assessment / Plan  Elevated iron chemistries    Labs and testing: repeat CBC, CMP, Ferritin, Fe / IBC, hemochromatosis screen  Next provider visit:   HFE positive, then see back (virtual is OK) to discuss and establish phlebotomy program, otherwise would not need hematology follow up.    History  This is an initial hematology consultation visit to address abnormal iron profile.  The patient saw her PCP with complaints of unexpected fatigue for many months. Iron was check and was unexpectedly elevated.  The patient does not take iron supplements, drinks infrequently, and has no history of liver problems.      She has family members with colon cancer and polyps and others with iron deficiency anemia.  No family member has iron overload.    Because of concerning family history, she's getting set up for screening colonoscopy.    She has an IUD in place.  Periods are scan.    ECOG = 0    Patient Active Problem List   Diagnosis     Health Care Home     CARDIOVASCULAR SCREENING; LDL GOAL LESS THAN 160     Headache     Anxiety state     Major depressive disorder, single episode, mild (H)     Congenital vesico-ureteric reflux     RAMÓN (generalized anxiety disorder)     Seasonal affective disorder (H)     Migraine without aura and without status migrainosus, not intractable     Multiple pigmented nevi     Current Outpatient Medications   Medication     citalopram (CELEXA) 20 MG tablet     levonorgestrel (MIRENA) 20 MCG/24HR IUD     multivitamin w/minerals (THERA-VIT-M) tablet     nitroFURantoin macrocrystal-monohydrate (MACROBID) 100 MG capsule     SUMAtriptan (IMITREX) 100 MG tablet     vitamin D3 (CHOLECALCIFEROL) 1.25 MG (42808 UT) capsule     No current facility-administered medications for this visit.     Past Medical History:   Diagnosis Date     ASCUS favor benign 06/2014    Co-test 3 yrs     Depressive disorder     post-partum     Headache      Microhematuria     pt reports longstanding      Vesico-ureteral reflux age  10     s/p surgery      Past Surgical History:   Procedure Laterality Date     BLADDER SURGERY       urinary reflux surgery  age 10     Socioeconomic History     Marital status:      Highest education level: Associate degree: occupational, technical, or vocational program     Social Determinants of Health     Social Connections: Moderately Integrated (5/23/2023)    Social Connection and Isolation Panel [NHANES]      Frequency of Communication with Friends and Family: More than three times a week      Frequency of Social Gatherings with Friends and Family: Twice a week      Attends Temple Services: 1 to 4 times per year      Active Member of Clubs or Organizations: No      Marital Status:      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    HENT:  Negative.    Eyes: Negative.    Respiratory: Negative.    Cardiovascular: Negative.    Gastrointestinal: Positive for nausea (Occasional, middle of the night).   Endocrine: Negative.    Genitourinary: Negative.     Musculoskeletal: Negative.    Skin: Negative.    Neurological: Positive for dizziness.   Hematological: Negative.    Psychiatric/Behavioral: Positive for sleep disturbance.   All other systems reviewed and are negative.      LMP  (LMP Unknown)     GENERAL: Healthy, alert and no distress  EYES: Eyes grossly normal to inspection.  No discharge or erythema, or obvious scleral/conjunctival abnormalities.  RESP: No audible wheeze, cough, or visible cyanosis.  No visible retractions or increased work of breathing.    SKIN: Visible skin clear. No significant rash, abnormal pigmentation or lesions.  NEURO: Cranial nerves grossly intact.  Mentation and speech appropriate for age.  PSYCH: Mentation appears normal, affect normal/bright, judgement and insight intact, normal speech and appearance well-groomed.    Recent Results (from the past 720 hour(s))   Pap screen with HPV - recommended age 30 - 65 years     Collection Time: 05/24/23  7:57 AM   Result Value Ref Range    Interpretation        Negative for Intraepithelial Lesion or Malignancy (NILM)    Comment         Papanicolaou Test Limitations:  Cervical cytology is a screening test with limited sensitivity, and regular screening is critical for cancer prevention.  Pap tests are primarily effective for the diagnosis/prevention of squamous cell carcinoma, not adenocarcinoma or other cancers.        Specimen Adequacy       Satisfactory for evaluation, endocervical/transformation zone component absent    Clinical Information       none      Reflex Testing Yes regardless of result     Previous Abnormal?       No      Performing Labs       The technical component of this testing was completed at [facility]     HPV High Risk Types DNA Cervical    Collection Time: 05/24/23  7:57 AM   Result Value Ref Range    Other HR HPV Negative Negative    HPV16 DNA Negative Negative    HPV18 DNA Negative Negative    FINAL DIAGNOSIS       This patient's sample is negative for HPV DNA.        This test was developed and its performance characteristics determined by the [facility]. It has not been cleared or approved by the FDA. The laboratory is regulated under CLIA as qualified to perform high-complexity testing. This test is used for clinical purposes. It should not be regarded as investigational or for research.    METHODOLOGY: The Roche Tushar 4800 system uses automated extraction, simultaneous amplification of HPV (L1 region) and beta-globin, followed by real time detection of fluorescent labeled HPV and beta globin using specific oligonucleotide probes. The test specifically identifies types HPV 16 DNA and HPV 18 DNA while concurrently detecting the rest of the high risk types (31, 33, 35, 39, 45, 51, 52, 56, 58, 59, 66 or 68).    COMMENTS: This test is not  intended for use as a screening device for woman under age 30 with normal cervical cytology. Results should be correlated with cytologic and histologic findings. Close clinical followup is recommended.       Comprehensive metabolic panel (BMP + Alb, Alk Phos, ALT, AST, Total. Bili, TP)    Collection Time: 05/24/23  8:14 AM   Result Value Ref Range    Sodium 139 136 - 145 mmol/L    Potassium 3.8 3.4 - 5.3 mmol/L    Chloride 106 98 - 107 mmol/L    Carbon Dioxide (CO2) 22 22 - 29 mmol/L    Anion Gap 11 7 - 15 mmol/L    Urea Nitrogen 15.3 6.0 - 20.0 mg/dL    Creatinine 0.92 0.51 - 0.95 mg/dL    Calcium 9.3 8.6 - 10.0 mg/dL    Glucose 101 (H) 70 - 99 mg/dL    Alkaline Phosphatase 48 35 - 104 U/L    AST 28 10 - 35 U/L    ALT 31 10 - 35 U/L    Protein Total 7.0 6.4 - 8.3 g/dL    Albumin 4.7 3.5 - 5.2 g/dL    Bilirubin Total 0.9 <=1.2 mg/dL    GFR Estimate 82 >60 mL/min/1.73m2   Lipid panel reflex to direct LDL Fasting    Collection Time: 05/24/23  8:14 AM   Result Value Ref Range    Cholesterol 201 (H) <200 mg/dL    Triglycerides 96 <150 mg/dL    Direct Measure HDL 47 (L) >=50 mg/dL    LDL Cholesterol Calculated 135 (H) <=100 mg/dL    Non HDL Cholesterol 154 (H) <130 mg/dL   TSH with free T4 reflex    Collection Time: 05/24/23  8:14 AM   Result Value Ref Range    TSH 1.62 0.30 - 4.20 uIU/mL   CBC with platelets    Collection Time: 05/24/23  8:14 AM   Result Value Ref Range    WBC Count 6.0 4.0 - 11.0 10e3/uL    RBC Count 4.73 3.80 - 5.20 10e6/uL    Hemoglobin 15.0 11.7 - 15.7 g/dL    Hematocrit 41.3 35.0 - 47.0 %    MCV 87 78 - 100 fL    MCH 31.7 26.5 - 33.0 pg    MCHC 36.3 31.5 - 36.5 g/dL    RDW 11.6 10.0 - 15.0 %    Platelet Count 267 150 - 450 10e3/uL   Ferritin    Collection Time: 05/24/23  8:14 AM   Result Value Ref Range    Ferritin 376 (H) 6 - 175 ng/mL   Vitamin D Deficiency    Collection Time: 05/24/23  8:14 AM   Result Value Ref Range    Vitamin D, Total (25-Hydroxy) 17 (L) 20 - 75 ug/L   CRP, inflammation     Collection Time: 05/24/23  8:14 AM   Result Value Ref Range    CRP Inflammation <3.00 <5.00 mg/L   Iron and iron binding capacity    Collection Time: 05/24/23  8:14 AM   Result Value Ref Range    Iron 185 (H) 37 - 145 ug/dL    Iron Binding Capacity 261 240 - 430 ug/dL    Iron Sat Index 71 (H) 15 - 46 %     Recent Results (from the past 744 hour(s))   MA Diagnostic Bilateral w/Lazaro    Narrative    DIAGNOSTIC MAMMOGRAM, BILATERAL, DIGITAL w/CAD AND TOMOSYNTHESIS -  6/1/2023 2:18 PM  ULTRASOUND LEFT BREAST    HISTORY:  Left breast lump.     COMPARISON:  None. Baseline.     BREAST DENSITY: Heterogeneously dense.    FINDINGS:  No mammographic findings suspicious for malignancy.    Targeted ultrasound evaluation of the left breast from 12:00 to 3:00 5  cm from the nipple at the site of palpable lump and pain demonstrates  no underlying sonographic abnormalities.       Impression    IMPRESSION: BI-RADS CATEGORY: 1 - NEGATIVE.    RECOMMENDED FOLLOW-UP: Annual Mammography, clinical follow-up.  Recommend routine annual screening mammography. Clinical follow-up is  recommended, with management dependent on the results/findings of the  physical examination.    The results and recommendation were communicated to the patient at the  conclusion of today's appointment.    CANDI ALMAGUER MD         SYSTEM ID:  [id number]     US Breast Left    Narrative    DIAGNOSTIC MAMMOGRAM, BILATERAL, DIGITAL w/CAD AND TOMOSYNTHESIS -  6/1/2023 2:18 PM  ULTRASOUND LEFT BREAST    HISTORY:  Left breast lump.     COMPARISON:  None. Baseline.     BREAST DENSITY: Heterogeneously dense.    FINDINGS:  No mammographic findings suspicious for malignancy.    Targeted ultrasound evaluation of the left breast from 12:00 to 3:00 5  cm from the nipple at the site of palpable lump and pain demonstrates  no underlying sonographic abnormalities.       Impression    IMPRESSION: BI-RADS CATEGORY: 1 - NEGATIVE.    RECOMMENDED FOLLOW-UP: Annual Mammography, clinical  follow-up.  Recommend routine annual screening mammography. Clinical follow-up is  recommended, with management dependent on the results/findings of the  physical examination.    The results and recommendation were communicated to the patient at the  conclusion of today's appointment.    CANDI ALMAGUER MD         SYSTEM ID:  [id number]         Medical decision Making    Reviewed Labs    Reviewed new imaging results    Notes from other providers and nursing staff  Total time for review of records, interview and examination, documentation = 35 mn    Virtual Visit    Platform:  Motista    Patient Location: OFF SITE - HOME    Provider Location: OFF SITE - HOME OFFICE    Visit Start Time: 2 PM    Visit End Time: 2:59 PM

## 2023-06-09 NOTE — PROGRESS NOTES
Virtual Visit Details    Type of service:  Video Visit     Originating Location (pt. Location): Home  Distant Location (provider location):  Off-site  Platform used for Video Visit: Francisca Londono CMA on 6/9/2023 at 1:40 PM

## 2023-06-28 ENCOUNTER — LAB (OUTPATIENT)
Dept: LAB | Facility: CLINIC | Age: 38
End: 2023-06-28
Payer: COMMERCIAL

## 2023-06-28 DIAGNOSIS — R79.0 ABNORMAL RESULT OF IRON PROFILE TESTING: ICD-10-CM

## 2023-06-28 LAB
ALBUMIN SERPL BCG-MCNC: 4.5 G/DL (ref 3.5–5.2)
ALP SERPL-CCNC: 41 U/L (ref 35–104)
ALT SERPL W P-5'-P-CCNC: 34 U/L (ref 0–50)
ANION GAP SERPL CALCULATED.3IONS-SCNC: 10 MMOL/L (ref 7–15)
AST SERPL W P-5'-P-CCNC: 27 U/L (ref 0–45)
BASOPHILS # BLD AUTO: 0 10E3/UL (ref 0–0.2)
BASOPHILS NFR BLD AUTO: 1 %
BILIRUB SERPL-MCNC: 1.2 MG/DL
BUN SERPL-MCNC: 13.7 MG/DL (ref 6–20)
CALCIUM SERPL-MCNC: 9 MG/DL (ref 8.6–10)
CHLORIDE SERPL-SCNC: 105 MMOL/L (ref 98–107)
CREAT SERPL-MCNC: 0.96 MG/DL (ref 0.51–0.95)
DEPRECATED HCO3 PLAS-SCNC: 25 MMOL/L (ref 22–29)
EOSINOPHIL # BLD AUTO: 0.1 10E3/UL (ref 0–0.7)
EOSINOPHIL NFR BLD AUTO: 1 %
ERYTHROCYTE [DISTWIDTH] IN BLOOD BY AUTOMATED COUNT: 11.4 % (ref 10–15)
FERRITIN SERPL-MCNC: 396 NG/ML (ref 6–175)
GFR SERPL CREATININE-BSD FRML MDRD: 78 ML/MIN/1.73M2
GLUCOSE SERPL-MCNC: 92 MG/DL (ref 70–99)
HCT VFR BLD AUTO: 40.2 % (ref 35–47)
HGB BLD-MCNC: 14.4 G/DL (ref 11.7–15.7)
IMM GRANULOCYTES # BLD: 0 10E3/UL
IMM GRANULOCYTES NFR BLD: 0 %
IRON BINDING CAPACITY (ROCHE): 268 UG/DL (ref 240–430)
IRON SATN MFR SERPL: 75 % (ref 15–46)
IRON SERPL-MCNC: 201 UG/DL (ref 37–145)
LAB DIRECTOR COMMENTS: NORMAL
LAB DIRECTOR DISCLAIMER: NORMAL
LAB DIRECTOR INTERPRETATION: NORMAL
LAB DIRECTOR METHODOLOGY: NORMAL
LAB DIRECTOR RESULTS: NORMAL
LYMPHOCYTES # BLD AUTO: 2 10E3/UL (ref 0.8–5.3)
LYMPHOCYTES NFR BLD AUTO: 37 %
MCH RBC QN AUTO: 31.3 PG (ref 26.5–33)
MCHC RBC AUTO-ENTMCNC: 35.8 G/DL (ref 31.5–36.5)
MCV RBC AUTO: 87 FL (ref 78–100)
MONOCYTES # BLD AUTO: 0.4 10E3/UL (ref 0–1.3)
MONOCYTES NFR BLD AUTO: 6 %
NEUTROPHILS # BLD AUTO: 3 10E3/UL (ref 1.6–8.3)
NEUTROPHILS NFR BLD AUTO: 55 %
PLATELET # BLD AUTO: 259 10E3/UL (ref 150–450)
POTASSIUM SERPL-SCNC: 4.2 MMOL/L (ref 3.4–5.3)
PROT SERPL-MCNC: 6.8 G/DL (ref 6.4–8.3)
RBC # BLD AUTO: 4.6 10E6/UL (ref 3.8–5.2)
SODIUM SERPL-SCNC: 140 MMOL/L (ref 136–145)
SPECIMEN DESCRIPTION: NORMAL
WBC # BLD AUTO: 5.5 10E3/UL (ref 4–11)

## 2023-06-28 PROCEDURE — 83540 ASSAY OF IRON: CPT

## 2023-06-28 PROCEDURE — 81256 HFE GENE: CPT

## 2023-06-28 PROCEDURE — 36415 COLL VENOUS BLD VENIPUNCTURE: CPT

## 2023-06-28 PROCEDURE — 85025 COMPLETE CBC W/AUTO DIFF WBC: CPT

## 2023-06-28 PROCEDURE — 80053 COMPREHEN METABOLIC PANEL: CPT

## 2023-06-28 PROCEDURE — 82728 ASSAY OF FERRITIN: CPT

## 2023-06-28 PROCEDURE — 83550 IRON BINDING TEST: CPT

## 2023-07-13 ENCOUNTER — TELEPHONE (OUTPATIENT)
Dept: ONCOLOGY | Facility: CLINIC | Age: 38
End: 2023-07-13
Payer: COMMERCIAL

## 2023-07-13 NOTE — CONFIDENTIAL NOTE
Notified patient that per DR. Soto, Hemachromatosis work up is negative and she can continue follow up with her Primary physician.Patient verbalized understanding and agreement to plan. Dia Leblanc RN on 7/13/2023 at 4:18 PM

## 2023-07-13 NOTE — TELEPHONE ENCOUNTER
"----- Message from Alden Soto MD sent at 7/10/2023  2:04 PM CDT -----  Regarding: RE: lab results  HFE gene is normal  ----- Message -----  From: Dia Leblanc RN  Sent: 7/7/2023   5:09 PM CDT  To: Alden Soto MD; Dia Leblanc RN  Subject: lab results                                      Hi Dr. Soto,  This patient had labs done on 6/28 per Dr. Ortiz and she said this:    \"Elevated iron chemistries     Labs and testing: repeat CBC, CMP, Ferritin, Fe / IBC, hemochromatosis screen  Next provider visit:   HFE positive, then see back (virtual is OK) to discuss and establish phlebotomy program, otherwise would not need hematology follow up.\"      Can you review her labs results now since they are finally back and let me know if she needs to get in to be seen?    Thanks!  Renuka           "

## 2023-09-01 ENCOUNTER — LAB (OUTPATIENT)
Dept: LAB | Facility: CLINIC | Age: 38
End: 2023-09-01
Payer: COMMERCIAL

## 2023-09-01 DIAGNOSIS — E83.19 IRON OVERLOAD: ICD-10-CM

## 2023-09-01 LAB
ERYTHROCYTE [DISTWIDTH] IN BLOOD BY AUTOMATED COUNT: 11.1 % (ref 10–15)
FERRITIN SERPL-MCNC: 358 NG/ML (ref 6–175)
HCT VFR BLD AUTO: 39 % (ref 35–47)
HGB BLD-MCNC: 14.3 G/DL (ref 11.7–15.7)
IRON BINDING CAPACITY (ROCHE): 259 UG/DL (ref 240–430)
IRON SATN MFR SERPL: 57 % (ref 15–46)
IRON SERPL-MCNC: 148 UG/DL (ref 37–145)
MCH RBC QN AUTO: 32.1 PG (ref 26.5–33)
MCHC RBC AUTO-ENTMCNC: 36.7 G/DL (ref 31.5–36.5)
MCV RBC AUTO: 88 FL (ref 78–100)
PLATELET # BLD AUTO: 289 10E3/UL (ref 150–450)
RBC # BLD AUTO: 4.45 10E6/UL (ref 3.8–5.2)
WBC # BLD AUTO: 5.6 10E3/UL (ref 4–11)

## 2023-09-01 PROCEDURE — 83540 ASSAY OF IRON: CPT

## 2023-09-01 PROCEDURE — 83550 IRON BINDING TEST: CPT

## 2023-09-01 PROCEDURE — 82728 ASSAY OF FERRITIN: CPT

## 2023-09-01 PROCEDURE — 85027 COMPLETE CBC AUTOMATED: CPT

## 2023-09-01 PROCEDURE — 36415 COLL VENOUS BLD VENIPUNCTURE: CPT

## 2023-09-04 DIAGNOSIS — E83.19 IRON OVERLOAD: Primary | ICD-10-CM

## 2023-11-27 ENCOUNTER — LAB (OUTPATIENT)
Dept: LAB | Facility: CLINIC | Age: 38
End: 2023-11-27
Payer: COMMERCIAL

## 2023-11-27 DIAGNOSIS — E83.19 IRON OVERLOAD: ICD-10-CM

## 2023-11-27 LAB
ERYTHROCYTE [DISTWIDTH] IN BLOOD BY AUTOMATED COUNT: 11.3 % (ref 10–15)
FERRITIN SERPL-MCNC: 437 NG/ML (ref 6–175)
HCT VFR BLD AUTO: 42 % (ref 35–47)
HGB BLD-MCNC: 15.1 G/DL (ref 11.7–15.7)
IRON BINDING CAPACITY (ROCHE): 283 UG/DL (ref 240–430)
IRON SATN MFR SERPL: 67 % (ref 15–46)
IRON SERPL-MCNC: 189 UG/DL (ref 37–145)
MCH RBC QN AUTO: 31.8 PG (ref 26.5–33)
MCHC RBC AUTO-ENTMCNC: 36 G/DL (ref 31.5–36.5)
MCV RBC AUTO: 88 FL (ref 78–100)
PLATELET # BLD AUTO: 294 10E3/UL (ref 150–450)
RBC # BLD AUTO: 4.75 10E6/UL (ref 3.8–5.2)
WBC # BLD AUTO: 7 10E3/UL (ref 4–11)

## 2023-11-27 PROCEDURE — 86140 C-REACTIVE PROTEIN: CPT | Performed by: PEDIATRICS

## 2023-11-27 PROCEDURE — 82728 ASSAY OF FERRITIN: CPT

## 2023-11-27 PROCEDURE — 85027 COMPLETE CBC AUTOMATED: CPT

## 2023-11-27 PROCEDURE — 83550 IRON BINDING TEST: CPT

## 2023-11-27 PROCEDURE — 36415 COLL VENOUS BLD VENIPUNCTURE: CPT

## 2023-11-27 PROCEDURE — 83540 ASSAY OF IRON: CPT

## 2023-11-28 DIAGNOSIS — R79.89 ELEVATED FERRITIN: Primary | ICD-10-CM

## 2023-11-28 LAB — CRP SERPL-MCNC: <3 MG/L

## 2023-11-29 DIAGNOSIS — E83.19 IRON OVERLOAD: ICD-10-CM

## 2023-11-29 DIAGNOSIS — R79.89 ELEVATED FERRITIN: Primary | ICD-10-CM

## 2023-12-21 ENCOUNTER — ONCOLOGY VISIT (OUTPATIENT)
Dept: ONCOLOGY | Facility: CLINIC | Age: 38
End: 2023-12-21
Attending: INTERNAL MEDICINE
Payer: COMMERCIAL

## 2023-12-21 VITALS
WEIGHT: 193 LBS | DIASTOLIC BLOOD PRESSURE: 79 MMHG | HEART RATE: 65 BPM | RESPIRATION RATE: 16 BRPM | BODY MASS INDEX: 31.77 KG/M2 | OXYGEN SATURATION: 98 % | SYSTOLIC BLOOD PRESSURE: 117 MMHG

## 2023-12-21 DIAGNOSIS — E83.19 IRON OVERLOAD: Primary | ICD-10-CM

## 2023-12-21 PROCEDURE — 99215 OFFICE O/P EST HI 40 MIN: CPT | Performed by: INTERNAL MEDICINE

## 2023-12-21 PROCEDURE — 99213 OFFICE O/P EST LOW 20 MIN: CPT | Performed by: INTERNAL MEDICINE

## 2023-12-21 ASSESSMENT — PAIN SCALES - GENERAL: PAINLEVEL: MODERATE PAIN (4)

## 2023-12-21 NOTE — LETTER
12/21/2023         RE: Lou Griffith  [id number] [facility] [address]        Dear Colleague,    Thank you for referring your patient, Lou Griffith, to the [facility]. Please see a copy of my visit note below.    HEMATOLOGY HISTORY: Mrs. Griffith is a female with iron overload.  1.  On 05/24/2023:  -Iron of 185 and iron saturation index of 71%.  -Ferritin of 376.  2.  On 06/28/2023:  -Iron of 201 and iron saturation index of 75%.  -Ferritin of 396.  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  3.  On 11/27/2023, iron of 189, iron saturation index of 67% and ferritin of 437.    Subjective:  Mrs. Griffith is a 38-year-old female with iron overload.  Her iron, iron saturation index and ferritin are elevated.  HFE gene analysis has revealed homozygosity for H63D.    Patient with generalized weakness. She feels tired all the time.  She also has brain fog.  Patient says that her concentration is not good.  Sometimes she is forgetful.  She is not confused.    No headache.  Occasional dizziness.  No chest pain.  No shortness of breath.  No abdominal pain.  No nausea or vomiting.  No urinary or bowel complaints.  Patient is premenopausal.  She is on Mirena IUD.  Because of that, she has very little menstrual bleeding.  No bleeding from any other site.    Exam:  Patient is alert oriented x 3.  Not in distress.  Vitals: Reviewed  Rest of the system is not examined.    Labs: Reviewed.    Assessment:  1.  A 38-year-old female with iron overload.  2.  Homozygosity for H63D.  3.  Fatigue and brain fog.  Could be related to iron overload.    Plan:  1.  I had a long discussion with the patient.  Investigations were all reviewed with her.  Explained to the patient that she has iron overload. Iron, ferritin and iron saturation index are all elevated.    She is homozygous for H63D.  Explained to her that she does not have hemochromatosis.  Because of homozygosity for H63D, she has  iron overload.    Discussed regarding iron overload.  Patient has some symptoms including fatigue and brain fog.  Explained to her this could be related to iron overload or could be related to other things.    2.  Explained to the patient that only way to get iron, ferritin and iron saturation index down is blood donation/phlebotomy.  I am recommending this as patient has symptom of fatigue and brain fog.  I told the patient to donate blood frequently. Hopefully her symptoms improve as her iron overload improves.  She is agreeable for it.  She will start donating blood.  I told her she can donate every 4 to 6 weeks for 6 months.  If her symptoms do not improve, then we will know it is not from iron overload.    3.  She had a few questions which were all answered.  I will see her again in 6 months time with labs.  Advised her to call us with any questions or concerns.    Total visit time of 40 minutes.  Time spent in today's visit, review of chart/investigations today and documentation today.        Again, thank you for allowing me to participate in the care of your patient.        Sincerely,        James Faria MD

## 2023-12-24 NOTE — PROGRESS NOTES
HEMATOLOGY HISTORY: Mrs. Griffith is a female with iron overload.  1.  On 05/24/2023:  -Iron of 185 and iron saturation index of 71%.  -Ferritin of 376.  2.  On 06/28/2023:  -Iron of 201 and iron saturation index of 75%.  -Ferritin of 396.  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  3.  On 11/27/2023, iron of 189, iron saturation index of 67% and ferritin of 437.    Subjective:  Mrs. Griffith is a 38-year-old female with iron overload.  Her iron, iron saturation index and ferritin are elevated.  HFE gene analysis has revealed homozygosity for H63D.    Patient with generalized weakness. She feels tired all the time.  She also has brain fog.  Patient says that her concentration is not good.  Sometimes she is forgetful.  She is not confused.    No headache.  Occasional dizziness.  No chest pain.  No shortness of breath.  No abdominal pain.  No nausea or vomiting.  No urinary or bowel complaints.  Patient is premenopausal.  She is on Mirena IUD.  Because of that, she has very little menstrual bleeding.  No bleeding from any other site.    Exam:  Patient is alert oriented x 3.  Not in distress.  Vitals: Reviewed  Rest of the system is not examined.    Labs: Reviewed.    Assessment:  1.  A 38-year-old female with iron overload.  2.  Homozygosity for H63D.  3.  Fatigue and brain fog.  Could be related to iron overload.    Plan:  1.  I had a long discussion with the patient.  Investigations were all reviewed with her.  Explained to the patient that she has iron overload. Iron, ferritin and iron saturation index are all elevated.    She is homozygous for H63D.  Explained to her that she does not have hemochromatosis.  Because of homozygosity for H63D, she has iron overload.    Discussed regarding iron overload.  Patient has some symptoms including fatigue and brain fog.  Explained to her this could be related to iron overload or could be related to other things.    2.  Explained to the patient that only way to get  iron, ferritin and iron saturation index down is blood donation/phlebotomy.  I am recommending this as patient has symptom of fatigue and brain fog.  I told the patient to donate blood frequently. Hopefully her symptoms improve as her iron overload improves.  She is agreeable for it.  She will start donating blood.  I told her she can donate every 4 to 6 weeks for 6 months.  If her symptoms do not improve, then we will know it is not from iron overload.    3.  She had a few questions which were all answered.  I will see her again in 6 months time with labs.  Advised her to call us with any questions or concerns.    Total visit time of 40 minutes.  Time spent in today's visit, review of chart/investigations today and documentation today.

## 2023-12-28 ENCOUNTER — TRANSFERRED RECORDS (OUTPATIENT)
Dept: HEALTH INFORMATION MANAGEMENT | Facility: CLINIC | Age: 38
End: 2023-12-28
Payer: COMMERCIAL

## 2024-02-27 ENCOUNTER — TELEPHONE (OUTPATIENT)
Dept: PEDIATRICS | Facility: CLINIC | Age: 39
End: 2024-02-27
Payer: COMMERCIAL

## 2024-02-27 NOTE — TELEPHONE ENCOUNTER
General Call    Contacts         Type Contact Phone/Fax    02/27/2024 04:10 PM CST Phone (Incoming) Lou Griffith (Self) [phone] (M)          Reason for Call:  Pt would like a wellness appt (her last one was 5/24/2023) but not seeing any available appts with Dr. Arreguin. Can you call pt if she can be seen in May or June? Thank you!    Could we send this information to you in [employer] [place] or would you prefer to receive a phone call?:   Patient would prefer a phone call   Okay to leave a detailed message?: Yes at Home number on file [phone] (home)

## 2024-05-24 SDOH — HEALTH STABILITY: PHYSICAL HEALTH: ON AVERAGE, HOW MANY DAYS PER WEEK DO YOU ENGAGE IN MODERATE TO STRENUOUS EXERCISE (LIKE A BRISK WALK)?: 3 DAYS

## 2024-05-24 SDOH — HEALTH STABILITY: PHYSICAL HEALTH: ON AVERAGE, HOW MANY MINUTES DO YOU ENGAGE IN EXERCISE AT THIS LEVEL?: 30 MIN

## 2024-05-24 ASSESSMENT — SOCIAL DETERMINANTS OF HEALTH (SDOH): HOW OFTEN DO YOU GET TOGETHER WITH FRIENDS OR RELATIVES?: TWICE A WEEK

## 2024-05-28 DIAGNOSIS — F33.0 MILD RECURRENT MAJOR DEPRESSION (H): ICD-10-CM

## 2024-05-28 DIAGNOSIS — F33.8 SEASONAL AFFECTIVE DISORDER (H): ICD-10-CM

## 2024-05-28 DIAGNOSIS — F41.1 GAD (GENERALIZED ANXIETY DISORDER): ICD-10-CM

## 2024-05-29 ENCOUNTER — PATIENT OUTREACH (OUTPATIENT)
Dept: ONCOLOGY | Facility: CLINIC | Age: 39
End: 2024-05-29

## 2024-05-29 ENCOUNTER — OFFICE VISIT (OUTPATIENT)
Dept: PEDIATRICS | Facility: CLINIC | Age: 39
End: 2024-05-29
Payer: COMMERCIAL

## 2024-05-29 VITALS
OXYGEN SATURATION: 97 % | TEMPERATURE: 98.7 F | HEART RATE: 84 BPM | BODY MASS INDEX: 32.11 KG/M2 | SYSTOLIC BLOOD PRESSURE: 93 MMHG | HEIGHT: 66 IN | WEIGHT: 199.8 LBS | RESPIRATION RATE: 16 BRPM | DIASTOLIC BLOOD PRESSURE: 64 MMHG

## 2024-05-29 DIAGNOSIS — G43.009 MIGRAINE WITHOUT AURA AND WITHOUT STATUS MIGRAINOSUS, NOT INTRACTABLE: ICD-10-CM

## 2024-05-29 DIAGNOSIS — F41.1 GAD (GENERALIZED ANXIETY DISORDER): ICD-10-CM

## 2024-05-29 DIAGNOSIS — Z80.0 FAMILY HISTORY OF COLON CANCER: ICD-10-CM

## 2024-05-29 DIAGNOSIS — Z00.00 ROUTINE GENERAL MEDICAL EXAMINATION AT A HEALTH CARE FACILITY: Primary | ICD-10-CM

## 2024-05-29 DIAGNOSIS — Z86.0100 HISTORY OF COLONIC POLYPS: ICD-10-CM

## 2024-05-29 DIAGNOSIS — Z97.5 IUD (INTRAUTERINE DEVICE) IN PLACE: ICD-10-CM

## 2024-05-29 DIAGNOSIS — Z15.89 BIALLELIC MUTATION OF HFE GENE: ICD-10-CM

## 2024-05-29 DIAGNOSIS — F33.0 MILD RECURRENT MAJOR DEPRESSION (H): ICD-10-CM

## 2024-05-29 DIAGNOSIS — E55.9 VITAMIN D DEFICIENCY: ICD-10-CM

## 2024-05-29 DIAGNOSIS — E83.19 IRON OVERLOAD: ICD-10-CM

## 2024-05-29 DIAGNOSIS — F33.8 SEASONAL AFFECTIVE DISORDER (H): ICD-10-CM

## 2024-05-29 DIAGNOSIS — Z80.41 FAMILY HISTORY OF MALIGNANT NEOPLASM OF OVARY: ICD-10-CM

## 2024-05-29 DIAGNOSIS — F32.0 MAJOR DEPRESSIVE DISORDER, SINGLE EPISODE, MILD (H): ICD-10-CM

## 2024-05-29 LAB
ALBUMIN SERPL BCG-MCNC: 4.3 G/DL (ref 3.5–5.2)
ALP SERPL-CCNC: 46 U/L (ref 40–150)
ALT SERPL W P-5'-P-CCNC: 39 U/L (ref 0–50)
ANION GAP SERPL CALCULATED.3IONS-SCNC: 10 MMOL/L (ref 7–15)
AST SERPL W P-5'-P-CCNC: 24 U/L (ref 0–45)
BILIRUB SERPL-MCNC: 0.4 MG/DL
BUN SERPL-MCNC: 9.7 MG/DL (ref 6–20)
CALCIUM SERPL-MCNC: 8.9 MG/DL (ref 8.6–10)
CHLORIDE SERPL-SCNC: 108 MMOL/L (ref 98–107)
CHOLEST SERPL-MCNC: 227 MG/DL
CREAT SERPL-MCNC: 0.89 MG/DL (ref 0.51–0.95)
DEPRECATED HCO3 PLAS-SCNC: 24 MMOL/L (ref 22–29)
EGFRCR SERPLBLD CKD-EPI 2021: 85 ML/MIN/1.73M2
ERYTHROCYTE [DISTWIDTH] IN BLOOD BY AUTOMATED COUNT: 11.7 % (ref 10–15)
FASTING STATUS PATIENT QL REPORTED: YES
FASTING STATUS PATIENT QL REPORTED: YES
FERRITIN SERPL-MCNC: 264 NG/ML (ref 6–175)
GLUCOSE SERPL-MCNC: 102 MG/DL (ref 70–99)
HCT VFR BLD AUTO: 41.2 % (ref 35–47)
HDLC SERPL-MCNC: 56 MG/DL
HGB BLD-MCNC: 14.9 G/DL (ref 11.7–15.7)
IRON BINDING CAPACITY (ROCHE): 287 UG/DL (ref 240–430)
IRON SATN MFR SERPL: 45 % (ref 15–46)
IRON SERPL-MCNC: 129 UG/DL (ref 37–145)
LDLC SERPL CALC-MCNC: 150 MG/DL
MCH RBC QN AUTO: 32.5 PG (ref 26.5–33)
MCHC RBC AUTO-ENTMCNC: 36.2 G/DL (ref 31.5–36.5)
MCV RBC AUTO: 90 FL (ref 78–100)
NONHDLC SERPL-MCNC: 171 MG/DL
PLATELET # BLD AUTO: 289 10E3/UL (ref 150–450)
POTASSIUM SERPL-SCNC: 4.3 MMOL/L (ref 3.4–5.3)
PROT SERPL-MCNC: 6.1 G/DL (ref 6.4–8.3)
RBC # BLD AUTO: 4.59 10E6/UL (ref 3.8–5.2)
SODIUM SERPL-SCNC: 142 MMOL/L (ref 135–145)
TRIGL SERPL-MCNC: 105 MG/DL
TSH SERPL DL<=0.005 MIU/L-ACNC: 1.67 UIU/ML (ref 0.3–4.2)
VIT D+METAB SERPL-MCNC: 18 NG/ML (ref 20–50)
WBC # BLD AUTO: 6.1 10E3/UL (ref 4–11)

## 2024-05-29 PROCEDURE — 84443 ASSAY THYROID STIM HORMONE: CPT | Performed by: PEDIATRICS

## 2024-05-29 PROCEDURE — 83550 IRON BINDING TEST: CPT | Performed by: PEDIATRICS

## 2024-05-29 PROCEDURE — 82306 VITAMIN D 25 HYDROXY: CPT | Performed by: PEDIATRICS

## 2024-05-29 PROCEDURE — 80061 LIPID PANEL: CPT | Performed by: PEDIATRICS

## 2024-05-29 PROCEDURE — 85027 COMPLETE CBC AUTOMATED: CPT | Performed by: PEDIATRICS

## 2024-05-29 PROCEDURE — 99213 OFFICE O/P EST LOW 20 MIN: CPT | Mod: 25 | Performed by: PEDIATRICS

## 2024-05-29 PROCEDURE — 99395 PREV VISIT EST AGE 18-39: CPT | Performed by: PEDIATRICS

## 2024-05-29 PROCEDURE — 80053 COMPREHEN METABOLIC PANEL: CPT | Performed by: PEDIATRICS

## 2024-05-29 PROCEDURE — 82728 ASSAY OF FERRITIN: CPT | Performed by: PEDIATRICS

## 2024-05-29 PROCEDURE — 83540 ASSAY OF IRON: CPT | Performed by: PEDIATRICS

## 2024-05-29 PROCEDURE — 36415 COLL VENOUS BLD VENIPUNCTURE: CPT | Performed by: PEDIATRICS

## 2024-05-29 RX ORDER — SUMATRIPTAN 100 MG/1
TABLET, FILM COATED ORAL
Qty: 18 TABLET | Refills: 11 | Status: SHIPPED | OUTPATIENT
Start: 2024-05-29

## 2024-05-29 RX ORDER — CITALOPRAM HYDROBROMIDE 20 MG/1
20 TABLET ORAL DAILY
Qty: 90 TABLET | Refills: 3 | OUTPATIENT
Start: 2024-05-29

## 2024-05-29 RX ORDER — CITALOPRAM HYDROBROMIDE 40 MG/1
40 TABLET ORAL DAILY
Qty: 90 TABLET | Refills: 3 | Status: SHIPPED | OUTPATIENT
Start: 2024-05-29

## 2024-05-29 ASSESSMENT — PATIENT HEALTH QUESTIONNAIRE - PHQ9
10. IF YOU CHECKED OFF ANY PROBLEMS, HOW DIFFICULT HAVE THESE PROBLEMS MADE IT FOR YOU TO DO YOUR WORK, TAKE CARE OF THINGS AT HOME, OR GET ALONG WITH OTHER PEOPLE: SOMEWHAT DIFFICULT
SUM OF ALL RESPONSES TO PHQ QUESTIONS 1-9: 15
SUM OF ALL RESPONSES TO PHQ QUESTIONS 1-9: 15

## 2024-05-29 ASSESSMENT — PAIN SCALES - GENERAL: PAINLEVEL: NO PAIN (0)

## 2024-05-29 NOTE — PROGRESS NOTES
Answers submitted by the patient for this visit:  Patient Health Questionnaire (Submitted on 5/29/2024)  If you checked off any problems, how difficult have these problems made it for you to do your work, take care of things at home, or get along with other people?: Somewhat difficult  PHQ9 TOTAL SCORE: 15  Preventive Care Visit  [facility] YENY Arreguin MD, Internal Medicine - Pediatrics  May 29, 2024      Assessment & Plan       ICD-10-CM    1. Routine general medical examination at a health care facility  Z00.00 PRIMARY CARE FOLLOW-UP SCHEDULING     Comprehensive metabolic panel (BMP + Alb, Alk Phos, ALT, AST, Total. Bili, TP)     Lipid panel reflex to direct LDL Fasting     CBC with platelets     Iron and iron binding capacity     Ferritin     TSH with free T4 reflex      2. RAMÓN (generalized anxiety disorder)  F41.1 citalopram (CELEXA) 40 MG tablet    Symptoms not optimally controlled with recent difficult stressors - plan increase in citalopram to 40mg daily and patient will alert me if not seeing improvement in 3-4 weeks      3. Migraine without aura and without status migrainosus, not intractable  G43.009 SUMAtriptan (IMITREX) 100 MG tablet  Well controlled, continue current medications        4. Major depressive disorder, single episode, mild (H24)  F32.0 citalopram (CELEXA) 40 MG tablet  See above      5. Seasonal affective disorder (H24)  F33.8 citalopram (CELEXA) 40 MG tablet      6. Mild recurrent major depression (H24)  F33.0 citalopram (CELEXA) 40 MG tablet      7. IUD (intrauterine device) in place - 2018  Z97.5 Due for replacement - believes she is at the 8 year darlyn - placed at [facility]      8. Iron overload  E83.19 CBC with platelets     Iron and iron binding capacity     Ferritin    Saw hematology late last year - working on frequent blood product donations.  Does notice improvement in how she feels post donation.        Recheck today      9. 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 Vitamin D deficiency  E55.9 Vitamin D Deficiency  Not currently on supplement - recheck      11. History of colonic polyps  Z86.010 On 5 year schedule for colonoscopy      12. Family history of colon cancer  Z80.0 Adult Genetics & Metabolism  Referral    Very strong family hx of malignancy - discussed cancer genetics eval      13. Family history of malignant neoplasm of ovary  Z80.41 Adult Genetics & Metabolism  Referral                BMI  Estimated body mass index is 32.65 kg/m  as calculated from the following:    Height as of this encounter: 1.666 m (5' 5.59\").    Weight as of this encounter: 90.6 kg (199 lb 12.8 oz).   Weight management plan: Discussed healthy diet and exercise guidelines    Counseling  Appropriate preventive services were discussed with this patient  See Patient Instructions    John Blake is a 39 year old, presenting for the following:  Physical        5/29/2024     6:49 AM   Additional Questions   Roomed by Fadia GALLEGO   Accompanied by Self         5/29/2024     6:49 AM   Patient Reported Additional Medications   Patient reports taking the following new medications No        Health Care Directive  Patient does not have a Health Care Directive or Living Will: Discussed advance care planning with patient; however, patient declined at this time.    HPI        5/24/2024   General Health   How would you rate your overall physical health? (!) FAIR   Feel stress (tense, anxious, or unable to sleep) To some extent   (!) STRESS CONCERN      5/24/2024   Nutrition   Three or more servings of calcium each day? Yes   Diet: Regular (no restrictions)   How many servings of fruit and vegetables per day? (!) 2-3   How many sweetened beverages each day? 0-1         5/24/2024   Exercise   Days per week of moderate/strenous exercise 3 days   Average minutes spent exercising at this level 30 " min         5/24/2024   Social Factors   Frequency of gathering with friends or relatives Twice a week   Worry food won't last until get money to buy more No   Food not last or not have enough money for food? No   Do you have housing?  Yes   Are you worried about losing your housing? No   Lack of transportation? No   Unable to get utilities (heat,electricity)? No         5/24/2024   Dental   Dentist two times every year? Yes         5/24/2024   TB Screening   Were you born outside of the [place]? No       Today's PHQ-9 Score:       5/29/2024     6:47 AM   PHQ-9 SCORE   PHQ-9 Total Score MyChart 15 (Moderately severe depression)   PHQ-9 Total Score 15         5/24/2024   Substance Use   Alcohol more than 3/day or more than 7/wk No   Do you use any other substances recreationally? No     Social History     Tobacco Use    Smoking status: Never    Smokeless tobacco: Never   Vaping Use    Vaping status: Never Used   Substance Use Topics    Alcohol use: Not Currently     Comment: once or twice q 3-4 months    Drug use: No           6/1/2023   LAST FHS-7 RESULTS   1st degree relative breast or ovarian cancer No   Any relative bilateral breast cancer No   Any male have breast cancer No   Any ONE woman have BOTH breast AND ovarian cancer No   Any woman with breast cancer before 50yrs No   2 or more relatives with breast AND/OR ovarian cancer No   2 or more relatives with breast AND/OR bowel cancer Yes       Mammogram Screening - Patient under 40 years of age: Routine Mammogram Screening not recommended.         5/24/2024   STI Screening   New sexual partner(s) since last STI/HIV test? No     History of abnormal Pap smear: No - age 30-64 HPV with reflex Pap every 5 years recommended        Latest Ref Rng & Units 5/24/2023     7:57 AM 12/31/2019     1:58 PM 12/31/2019     1:55 PM   PAP / HPV   PAP  Negative for Intraepithelial Lesion or Malignancy (NILM)      PAP (Historical)   NIL     HPV 16 DNA Negative Negative   Negative    HPV  "18 DNA Negative Negative   Negative    Other HR HPV Negative Negative   Negative            5/24/2024   Contraception/Family Planning   Questions about contraception or family planning No        Reviewed and updated as needed this visit by Provider                    Migraines - stable, no new neurologic symptoms - rare imitrex use.  Neck pain and stress are triggers      Iron overload - has donated blood a couple times    Anxiety/depression - high stress recently with father's prostate cancer diagnosis.  Increased citalopram on own to 30mg recently.  Gaining weight/stress eating - typical tell for her when mood is not in a good place.      4/27/2022     7:30 AM 5/23/2023     8:35 AM 5/29/2024     6:47 AM   PHQ   PHQ-9 Total Score 11 9 15   Q9: Thoughts of better off dead/self-harm past 2 weeks Not at all Not at all Not at all         Hx of colonic polyps - had colonoscopy this spring - on 5 year repeat plan    IUD - due for switch - spotting every few months    Kids - lAvaro 13, Mel 17 - doing really well - very busy    Fatigue is worse recently - not sure if it is frequent travel to [place] to help care for her dad, mood, or all of the above.     ROS: 10 point ROS neg other than the symptoms noted above in the HPI.       Objective    Exam  BP 93/64 (BP Location: Right arm, Patient Position: Sitting, Cuff Size: Adult Large)   Pulse 84   Temp 98.7  F (37.1  C) (Temporal)   Resp 16   Ht 1.666 m (5' 5.59\")   Wt 90.6 kg (199 lb 12.8 oz)   SpO2 97%   BMI 32.65 kg/m     Estimated body mass index is 32.65 kg/m  as calculated from the following:    Height as of this encounter: 1.666 m (5' 5.59\").    Weight as of this encounter: 90.6 kg (199 lb 12.8 oz).  Wt Readings from Last 4 Encounters:   05/29/24 90.6 kg (199 lb 12.8 oz)   12/21/23 87.5 kg (193 lb)   05/24/23 89 kg (196 lb 3.2 oz)   04/27/22 83.5 kg (184 lb)         Physical Exam  GENERAL: alert and no distress  EYES: Eyes grossly normal to inspection, PERRL " and conjunctivae and sclerae normal  HENT: ear canals and TM's normal, nose and mouth without ulcers or lesions  NECK: no adenopathy, no asymmetry, masses, or scars  RESP: lungs clear to auscultation - no rales, rhonchi or wheezes  CV: regular rate and rhythm, normal S1 S2, no S3 or S4, no murmur, click or rub, no peripheral edema  ABDOMEN: soft, nontender, no hepatosplenomegaly, no masses and bowel sounds normal  MS: no gross musculoskeletal defects noted, no edema  SKIN: no suspicious lesions or rashes  NEURO: Normal strength and tone, mentation intact and speech normal  PSYCH: mentation appears normal, affect normal/bright        Signed Electronically by: Rita Arreguin MD

## 2024-05-29 NOTE — PROGRESS NOTES
New Patient Oncology Nurse Navigator Note     Referring provider:   Rita Arreguin MD Ea /Peds [facility] [phone]        Referred to (specialty):  Genetic Counseling    Date Referral Received: 5/29/2024     Evaluation for:   LAST FHS-7 RESULTS   1st degree relative breast or ovarian cancer No   Any relative bilateral breast cancer No   Any male have breast cancer No   Any ONE woman have BOTH breast AND ovarian cancer No   Any woman with breast cancer before 50yrs No   2 or more relatives with breast AND/OR ovarian cancer No   2 or more relatives with breast AND/OR bowel cancer Yes

## 2024-05-29 NOTE — PATIENT INSTRUCTIONS
Labs today    Increase citalopram to 40mg daily    Expect a call from the cancer genetics team    Sometimes this year, IUD switch

## 2024-05-30 ENCOUNTER — PATIENT OUTREACH (OUTPATIENT)
Dept: GASTROENTEROLOGY | Facility: CLINIC | Age: 39
End: 2024-05-30
Payer: COMMERCIAL

## 2024-07-30 ASSESSMENT — PATIENT HEALTH QUESTIONNAIRE - PHQ9
SUM OF ALL RESPONSES TO PHQ QUESTIONS 1-9: 12
10. IF YOU CHECKED OFF ANY PROBLEMS, HOW DIFFICULT HAVE THESE PROBLEMS MADE IT FOR YOU TO DO YOUR WORK, TAKE CARE OF THINGS AT HOME, OR GET ALONG WITH OTHER PEOPLE: SOMEWHAT DIFFICULT
SUM OF ALL RESPONSES TO PHQ QUESTIONS 1-9: 12

## 2024-07-31 ENCOUNTER — OFFICE VISIT (OUTPATIENT)
Dept: PEDIATRICS | Facility: CLINIC | Age: 39
End: 2024-07-31
Payer: COMMERCIAL

## 2024-07-31 VITALS
SYSTOLIC BLOOD PRESSURE: 120 MMHG | HEART RATE: 79 BPM | RESPIRATION RATE: 16 BRPM | WEIGHT: 202 LBS | DIASTOLIC BLOOD PRESSURE: 70 MMHG | HEIGHT: 66 IN | TEMPERATURE: 98.4 F | BODY MASS INDEX: 32.47 KG/M2 | OXYGEN SATURATION: 100 %

## 2024-07-31 DIAGNOSIS — N63.11 MASS OF UPPER OUTER QUADRANT OF RIGHT BREAST: Primary | ICD-10-CM

## 2024-07-31 PROCEDURE — G2211 COMPLEX E/M VISIT ADD ON: HCPCS | Performed by: PHYSICIAN ASSISTANT

## 2024-07-31 PROCEDURE — 99213 OFFICE O/P EST LOW 20 MIN: CPT | Performed by: PHYSICIAN ASSISTANT

## 2024-07-31 ASSESSMENT — PAIN SCALES - GENERAL: PAINLEVEL: NO PAIN (0)

## 2024-07-31 NOTE — PROGRESS NOTES
"Assessment & Plan     Mass of upper outer quadrant of right breast  Discussed watchful waiting or proceeding with imaging for further evaluation.   Patient would like to get imaging done.   Diagnostic Mammogram and US ordered.  - MA Diagnostic Digital Bilateral  - US Breast Right Limited 1-3 Quadrants    FUTURE APPOINTMENTS:       - Follow-up as needed    Subjective   Lou is a 39 year old, presenting for the following health issues:  Breast Problem        7/31/2024     9:01 AM   Additional Questions   Roomed by Hanna GALLEGO CMA   Accompanied by Self         7/31/2024     9:01 AM   Patient Reported Additional Medications   Patient reports taking the following new medications n/a     History of Present Illness       Reason for visit:  Possible new lump right breast    She eats 2-3 servings of fruits and vegetables daily.She consumes 0 sweetened beverage(s) daily.She exercises with enough effort to increase her heart rate 20 to 29 minutes per day.  She exercises with enough effort to increase her heart rate 3 or less days per week.   She is taking medications regularly.     Pt is here for a new lump in her right breast that she noticed a few days ago. She was putting on some lotion when she noticed it. Small lump on outer side near armpit. No personal hx of breast cancer and no breast cancer in the family. Pt father recently diagnosed with stage 4 prostate cancer and positive for BRCA gene. Denies any nipple discharge or skin changes.         Review of Systems  Constitutional, HEENT, cardiovascular, pulmonary, gi and gu systems are negative, except as otherwise noted.      Objective    /70   Pulse 79   Temp 98.4  F (36.9  C) (Tympanic)   Resp 16   Ht 1.664 m (5' 5.5\")   Wt 91.6 kg (202 lb)   LMP  (LMP Unknown)   SpO2 100%   BMI 33.10 kg/m    Body mass index is 33.1 kg/m .    Physical Exam   GENERAL: alert and no distress  BREAST: Left breast: normal without masses, tenderness or nipple discharge and no " palpable axillary masses or adenopathy Right breast: with small round mobile mass of upper outer quadrant at about the 10 o'clock postion. No tenderness or pain.  SKIN: no suspicious lesions or rashes  PSYCH: mentation appears normal, affect normal/bright        Signed Electronically by: Mine Robles PA-C

## 2024-08-06 ENCOUNTER — HOSPITAL ENCOUNTER (OUTPATIENT)
Dept: MAMMOGRAPHY | Facility: CLINIC | Age: 39
Discharge: HOME | End: 2024-08-06
Attending: PHYSICIAN ASSISTANT
Payer: COMMERCIAL

## 2024-08-06 ENCOUNTER — HOSPITAL ENCOUNTER (OUTPATIENT)
Dept: ULTRASOUND IMAGING | Facility: CLINIC | Age: 39
Discharge: HOME | End: 2024-08-06
Attending: PHYSICIAN ASSISTANT
Payer: COMMERCIAL

## 2024-08-06 DIAGNOSIS — N63.11 MASS OF UPPER OUTER QUADRANT OF RIGHT BREAST: ICD-10-CM

## 2024-08-06 PROCEDURE — 76642 ULTRASOUND BREAST LIMITED: CPT | Mod: RT

## 2024-08-06 PROCEDURE — 77062 BREAST TOMOSYNTHESIS BI: CPT

## 2024-08-07 ENCOUNTER — HOSPITAL ENCOUNTER (OUTPATIENT)
Dept: MAMMOGRAPHY | Facility: CLINIC | Age: 39
Discharge: HOME | End: 2024-08-07
Attending: PHYSICIAN ASSISTANT
Payer: COMMERCIAL

## 2024-08-07 ENCOUNTER — HOSPITAL ENCOUNTER (OUTPATIENT)
Dept: ULTRASOUND IMAGING | Facility: CLINIC | Age: 39
Discharge: HOME | End: 2024-08-07
Attending: PHYSICIAN ASSISTANT
Payer: COMMERCIAL

## 2024-08-07 DIAGNOSIS — N63.11 MASS OF UPPER OUTER QUADRANT OF RIGHT BREAST: ICD-10-CM

## 2024-08-07 PROCEDURE — 272N000615 US BREAST BIOPSY CORE NEEDLE RIGHT

## 2024-08-07 PROCEDURE — 999N000065 MA POST PROCEDURE RIGHT

## 2024-08-07 PROCEDURE — 250N000009 HC RX 250: Performed by: RADIOLOGY

## 2024-08-07 PROCEDURE — 88305 TISSUE EXAM BY PATHOLOGIST: CPT | Mod: 26 | Performed by: PATHOLOGY

## 2024-08-07 PROCEDURE — 88305 TISSUE EXAM BY PATHOLOGIST: CPT | Mod: TC | Performed by: PHYSICIAN ASSISTANT

## 2024-08-07 RX ADMIN — LIDOCAINE HYDROCHLORIDE 5 ML: 10 INJECTION, SOLUTION EPIDURAL; INFILTRATION; INTRACAUDAL; PERINEURAL at 10:03

## 2024-08-07 NOTE — DISCHARGE INSTRUCTIONS

## 2024-08-08 ENCOUNTER — TELEPHONE (OUTPATIENT)
Dept: MAMMOGRAPHY | Facility: CLINIC | Age: 39
End: 2024-08-08
Payer: COMMERCIAL

## 2024-08-08 LAB
PATH REPORT.COMMENTS IMP SPEC: NORMAL
PATH REPORT.FINAL DX SPEC: NORMAL
PATH REPORT.GROSS SPEC: NORMAL
PATH REPORT.MICROSCOPIC SPEC OTHER STN: NORMAL
PHOTO IMAGE: NORMAL

## 2024-08-08 NOTE — TELEPHONE ENCOUNTER
Pathology report reviewed with our breast radiologist Dr Rios, who confirmed the recent breast imaging is concordant with the final pathology results below.    I phoned Ms awad, confirmed her full name, date of birth, and informed patient of her ultrasound Guided right Breast Needle Biopsy (08/07/24) results showing benign Fibroadenoma     Recommended follow up is routine care and screening mammograms at age 40.   Patient states no problems or concerns with her biopsy site.   Questions were answered and my phone number given if she has further questions or concerns.  I informed patient I will notify the ordering provider of the results and recommendations for follow up.  Patient verbalized understanding and agrees with the plan of care.     Ibis Ybarra RN CBCN  Breast Care Nurse Coordinator  [facility]  [phone]

## 2024-08-28 ENCOUNTER — LAB (OUTPATIENT)
Dept: LAB | Facility: CLINIC | Age: 39
End: 2024-08-28
Payer: COMMERCIAL

## 2024-08-28 DIAGNOSIS — E83.19 IRON OVERLOAD: ICD-10-CM

## 2024-08-28 LAB
ERYTHROCYTE [DISTWIDTH] IN BLOOD BY AUTOMATED COUNT: 11.7 % (ref 10–15)
HCT VFR BLD AUTO: 40.5 % (ref 35–47)
HGB BLD-MCNC: 14.4 G/DL (ref 11.7–15.7)
MCH RBC QN AUTO: 31.4 PG (ref 26.5–33)
MCHC RBC AUTO-ENTMCNC: 35.6 G/DL (ref 31.5–36.5)
MCV RBC AUTO: 88 FL (ref 78–100)
PLATELET # BLD AUTO: 281 10E3/UL (ref 150–450)
RBC # BLD AUTO: 4.58 10E6/UL (ref 3.8–5.2)
WBC # BLD AUTO: 6.8 10E3/UL (ref 4–11)

## 2024-08-28 PROCEDURE — 85027 COMPLETE CBC AUTOMATED: CPT

## 2024-08-28 PROCEDURE — 82728 ASSAY OF FERRITIN: CPT

## 2024-08-28 PROCEDURE — 83540 ASSAY OF IRON: CPT

## 2024-08-28 PROCEDURE — 36415 COLL VENOUS BLD VENIPUNCTURE: CPT

## 2024-08-28 PROCEDURE — 83550 IRON BINDING TEST: CPT

## 2024-08-29 ENCOUNTER — VIRTUAL VISIT (OUTPATIENT)
Dept: ONCOLOGY | Facility: CLINIC | Age: 39
End: 2024-08-29
Attending: INTERNAL MEDICINE
Payer: COMMERCIAL

## 2024-08-29 VITALS — WEIGHT: 190 LBS | HEIGHT: 65 IN | BODY MASS INDEX: 31.65 KG/M2

## 2024-08-29 DIAGNOSIS — E83.19 IRON OVERLOAD: Primary | ICD-10-CM

## 2024-08-29 LAB
FERRITIN SERPL-MCNC: 372 NG/ML (ref 6–175)
IRON BINDING CAPACITY (ROCHE): 279 UG/DL (ref 240–430)
IRON SATN MFR SERPL: 70 % (ref 15–46)
IRON SERPL-MCNC: 196 UG/DL (ref 37–145)

## 2024-08-29 PROCEDURE — 99213 OFFICE O/P EST LOW 20 MIN: CPT | Mod: 95 | Performed by: INTERNAL MEDICINE

## 2024-08-29 ASSESSMENT — PAIN SCALES - GENERAL: PAINLEVEL: NO PAIN (0)

## 2024-08-29 NOTE — PATIENT INSTRUCTIONS
Donate blood every 6-8 weeks.  Follow-up in 6 months with labs.  Liver ultrasound in next few weeks.

## 2024-08-29 NOTE — PROGRESS NOTES
Virtual Visit Details    Type of service:  Video Visit     Originating Location (pt. Location): Home    Distant Location (provider location):  On-site  Platform used for Video Visit: logolineup    HEMATOLOGY HISTORY: Mrs. Griffith is a female with iron overload.  1.  On 05/24/2023:  -Iron of 185 and iron saturation index of 71%.  -Ferritin of 376.  2.  On 06/28/2023:  -Iron of 201 and iron saturation index of 75%.  -Ferritin of 396.  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  3.  On 11/27/2023, iron of 189, iron saturation index of 67% and ferritin of 437.     Subjective:  Mrs. Griffith is a 39-year-old female with iron overload.  Iron, iron saturation index and ferritin are elevated. She is homozygous for H63D.    She was advised to donate blood.  She has not donated blood for last few months.    Discussed regarding menstrual bleeding.  She has Mirena IUD.  She gets spotting only.    She has mild generalized weakness.  She is able to do all her activities.  Sometimes her concentration is not good.  She gets brain fog.     No headache. No dizziness.  No chest pain.  No shortness of breath.  No abdominal pain.  No nausea or vomiting.  No urinary or bowel complaints.      Exam:  Patient is alert and oriented x 3.  Not in distress.  Rest of the system is not examined as this is a video visit.     Labs: CBC, iron and ferritin reviewed.     Assessment:  1.  A 39-year-old female with iron overload.  2.  Homozygosity for H63D.  3.  Generalized weakness and brain fog.       Plan:  Donate blood every 6-8 weeks.  Follow-up in 6 months with labs.  Liver ultrasound in next few weeks.    Discussion:  1.  Labs were reviewed with the patient.  Iron, iron saturation index and ferritin are all elevated.  Patient has iron overload.  Patient does not have hemochromatosis.  Homozygosity for H63D does not cause hemochromatosis.    For iron overload, patient advised to donate blood every 6 to 8 weeks.  I am hoping some of her symptoms  of fatigue and brain fog will improve.    2.  Discussed regarding ultrasound of liver.  She is agreeable for it.  In future, we may consider MRI of the liver.    3.  She had a few questions which were all answered.  I will see her in 6 months time with labs.     Total video visit time of 20 minutes.  Time spent in today's visit, review of chart/investigations today and documentation today.

## 2024-08-29 NOTE — RESULT ENCOUNTER NOTE
Dear Ms. Griffith,    Blood test is normal.    Please, call me with any questions.    James Faria MD

## 2024-08-29 NOTE — LETTER
8/29/2024      Lou Griffith  [address] [place] [address]      Dear Colleague,    Thank you for referring your patient, Lou Griffith, to the [facility]. Please see a copy of my visit note below.    Virtual Visit Details    Type of service:  Video Visit     Originating Location (pt. Location): Home    Distant Location (provider location):  On-site  Platform used for Video Visit: [facility]    HEMATOLOGY HISTORY: Mrs. Griffith is a female with iron overload.  1.  On 05/24/2023:  -Iron of 185 and iron saturation index of 71%.  -Ferritin of 376.  2.  On 06/28/2023:  -Iron of 201 and iron saturation index of 75%.  -Ferritin of 396.  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  3.  On 11/27/2023, iron of 189, iron saturation index of 67% and ferritin of 437.     Subjective:  Mrs. Griffith is a 39-year-old female with iron overload.  Iron, iron saturation index and ferritin are elevated. She is homozygous for H63D.    She was advised to donate blood.  She has not donated blood for last few months.    Discussed regarding menstrual bleeding.  She has Mirena IUD.  She gets spotting only.    She has mild generalized weakness.  She is able to do all her activities.  Sometimes her concentration is not good.  She gets brain fog.     No headache. No dizziness.  No chest pain.  No shortness of breath.  No abdominal pain.  No nausea or vomiting.  No urinary or bowel complaints.      Exam:  Patient is alert and oriented x 3.  Not in distress.  Rest of the system is not examined as this is a video visit.     Labs: CBC, iron and ferritin reviewed.     Assessment:  1.  A 39-year-old female with iron overload.  2.  Homozygosity for H63D.  3.  Generalized weakness and brain fog.       Plan:  Donate blood every 6-8 weeks.  Follow-up in 6 months with labs.  Liver ultrasound in next few weeks.    Discussion:  1.  Labs were reviewed with the patient.  Iron, iron saturation index and  ferritin are all elevated.  Patient has iron overload.  Patient does not have hemochromatosis.  Homozygosity for H63D does not cause hemochromatosis.    For iron overload, patient advised to donate blood every 6 to 8 weeks.  I am hoping some of her symptoms of fatigue and brain fog will improve.    2.  Discussed regarding ultrasound of liver.  She is agreeable for it.  In future, we may consider MRI of the liver.    3.  She had a few questions which were all answered.  I will see her in 6 months time with labs.     Total video visit time of 20 minutes.  Time spent in today's visit, review of chart/investigations today and documentation today.      Again, thank you for allowing me to participate in the care of your patient.        Sincerely,        James Faria MD

## 2024-08-29 NOTE — NURSING NOTE
Current patient location: Pts work    Is the patient currently in the state of [place]? YES    Visit mode:VIDEO    If the visit is dropped, the patient can be reconnected by: VIDEO VISIT: Text to cell phone:   Telephone Information:   Mobile [phone]       Will anyone else be joining the visit? NO  (If patient encounters technical issues they should call [phone]    How would you like to obtain your AVS? MyChart    Are changes needed to the allergy or medication list? No    Are refills needed on medications prescribed by this physician? NO    Rooming Documentation:  Unable to complete questionnaire(s) due to time      Reason for visit: PATRICIA GOLDSMITH

## 2024-08-29 NOTE — LETTER
8/29/2024      Lou Griffith  [address] [place] [address]      Dear Colleague,    Thank you for referring your patient, Lou Griffith, to the [facility]. Please see a copy of my visit note below.    Virtual Visit Details    Type of service:  Video Visit     Originating Location (pt. Location): Home    Distant Location (provider location):  On-site  Platform used for Video Visit: [facility]    HEMATOLOGY HISTORY: Mrs. Griffith is a female with iron overload.  1.  On 05/24/2023:  -Iron of 185 and iron saturation index of 71%.  -Ferritin of 376.  2.  On 06/28/2023:  -Iron of 201 and iron saturation index of 75%.  -Ferritin of 396.  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  3.  On 11/27/2023, iron of 189, iron saturation index of 67% and ferritin of 437.     Subjective:  Mrs. Griffith is a 39-year-old female with iron overload.  Iron, iron saturation index and ferritin are elevated. She is homozygous for H63D.    She was advised to donate blood.  She has not donated blood for last few months.    Discussed regarding menstrual bleeding.  She has Mirena IUD.  She gets spotting only.    She has mild generalized weakness.  She is able to do all her activities.  Sometimes her concentration is not good.  She gets brain fog.     No headache. No dizziness.  No chest pain.  No shortness of breath.  No abdominal pain.  No nausea or vomiting.  No urinary or bowel complaints.      Exam:  Patient is alert and oriented x 3.  Not in distress.  Rest of the system is not examined as this is a video visit.     Labs: CBC, iron and ferritin reviewed.     Assessment:  1.  A 39-year-old female with iron overload.  2.  Homozygosity for H63D.  3.  Generalized weakness and brain fog.       Plan:  Donate blood every 6-8 weeks.  Follow-up in 6 months with labs.  Liver ultrasound in next few weeks.    Discussion:  1.  Labs were reviewed with the patient.  Iron, iron saturation index and  ferritin are all elevated.  Patient has iron overload.  Patient does not have hemochromatosis.  Homozygosity for H63D does not cause hemochromatosis.    For iron overload, patient advised to donate blood every 6 to 8 weeks.  I am hoping some of her symptoms of fatigue and brain fog will improve.    2.  Discussed regarding ultrasound of liver.  She is agreeable for it.  In future, we may consider MRI of the liver.    3.  She had a few questions which were all answered.  I will see her in 6 months time with labs.     Total video visit time of 20 minutes.  Time spent in today's visit, review of chart/investigations today and documentation today.      Again, thank you for allowing me to participate in the care of your patient.        Sincerely,        James Faria MD

## 2024-09-03 PROBLEM — E83.19 IRON OVERLOAD: Status: ACTIVE | Noted: 2024-09-03

## 2024-09-04 ENCOUNTER — HOSPITAL ENCOUNTER (OUTPATIENT)
Dept: ULTRASOUND IMAGING | Facility: CLINIC | Age: 39
Discharge: HOME | End: 2024-09-04
Attending: INTERNAL MEDICINE | Admitting: INTERNAL MEDICINE
Payer: COMMERCIAL

## 2024-09-04 DIAGNOSIS — E83.19 IRON OVERLOAD: ICD-10-CM

## 2024-09-04 PROCEDURE — 76705 ECHO EXAM OF ABDOMEN: CPT

## 2024-09-05 NOTE — RESULT ENCOUNTER NOTE
Dear Ms. Griffith,    Ultrasound reveals normal liver and gallbladder. This is good.    Please, call me with any questions.    James Faria MD

## 2024-11-18 ENCOUNTER — MYC MEDICAL ADVICE (OUTPATIENT)
Dept: PEDIATRICS | Facility: CLINIC | Age: 39
End: 2024-11-18
Payer: COMMERCIAL

## 2025-02-25 ENCOUNTER — NURSE TRIAGE (OUTPATIENT)
Dept: PEDIATRICS | Facility: CLINIC | Age: 40
End: 2025-02-25
Payer: COMMERCIAL

## 2025-02-25 NOTE — TELEPHONE ENCOUNTER
Nurse Triage SBAR    Is this a 2nd Level Triage? NO    Situation: patient reporting hand swelling    Background: States noticed hand swelling this morning. Denies known cause, injury, allergic reaction. Reports history of iron overload she is concerned may be linked, last ferritin 8/28/24: 372. Denies change of pregnancy due to IUD.    Assessment: States swelling is to right hand along 2nd and 3rd digits going laterally along metacarpals and down to wrist. States is not a ball or lump, but flat swelling. Denies bruising or redness, states skin seems almost light color. Denies numbness/tingling, fever, lack of mobility, headache, chest pain, difficulty breathing, pain, vision change, and weakness.     Protocol Recommended Disposition:   See PCP Within 24 Hours    Recommendation: per protocol recommended patient be seen for OV within 24 hours. Scheduled for OV tomorrow 2/26/25 with Yanet Linares as timeslot worked best for patient and PCP/RUTH not available. Instructed patient to call [phone] for new or worsening symptoms or further questions. Patient verbalized understanding and agrees with the plan.      Reason for Disposition   MODERATE hand swelling (e.g., visible swelling of hand and fingers; pitting edema)    Additional Information   Negative: SEVERE difficulty breathing (e.g., struggling for each breath, speaks in single words)   Negative: Sounds like a life-threatening emergency to the triager   Negative: Followed a hand injury   Negative: Swelling followed an insect bite to the area   Negative: Swelling followed a bee, wasp, or other sting to the area   Negative: Swelling of arm is main symptom   Negative: Swelling of wrist is main symptom   Negative: Cast problems or questions   Negative: Pain, redness, swelling, or pus at IV Site   Negative: Difficulty breathing at rest   Negative: Looks like a broken bone or dislocated joint (e.g., crooked or deformed)   Negative: Entire hand is cool or blue in  "comparison to other side   Negative: [1] Can't use hand or can barely use hand AND [2] new-onset   Negative: [1] Difficulty breathing with exertion (e.g., walking) AND [2] new-onset or worsening   Negative: [1] Red area or streak AND [2] fever   Negative: [1] Swelling is painful to touch AND [2] fever   Negative: [1] Cast arm AND [2] now increased pain   Negative: [1] Postpartum (from 0 to 6 weeks after delivery) AND [2] new blurred vision or vision changes   Negative: [1] Postpartum (from 0 to 6 weeks after delivery) AND [2] face swelling   Negative: Patient sounds very sick or weak to the triager   Negative: [1] Pregnant 20 or more weeks AND [2] new face swelling   Negative: [1] Pregnant 20 or more weeks AND [2] new blurred vision or vision changes   Negative: SEVERE hand swelling (e.g., swelling of entire hand and up into forearm)   Negative: [1] Red area or streak [2] large (> 2 in. or 5 cm)    Answer Assessment - Initial Assessment Questions  1. ONSET: \"When did the swelling start?\" (e.g., minutes, hours, days)      This morning   2. LOCATION: \"What part of the hand is swollen?\"  \"Are both hands swollen or just one hand?\"      Right hand  3. SEVERITY: \"How bad is the swelling?\" (e.g., localized; mild, moderate, severe)    - BALL OR LUMP: small ball or lump    - LOCALIZED: puffy or swollen area or patch of skin    - JOINT SWELLING: swelling of a joint    - MILD: puffiness or mild swelling of fingers or hand    - MODERATE: fingers and hand are swollen    - SEVERE: swelling of entire hand and up into forearm      Swelling follows along metacarpal of middle finger to wrist and same with second; able to close hands and move fingers  4. REDNESS: \"Does the swelling look red or infected?\"      No; seems lighter in color  5. PAIN: \"Is the swelling painful to touch?\" If Yes, ask: \"How painful is it?\"   (Scale 1-10; mild, moderate or severe)      0/10  6. FEVER: \"Do you have a fever?\" If Yes, ask: \"What is it, how was it " "measured, and when did it start?\"       no  7. CAUSE: \"What do you think is causing the hand swelling?\" (e.g., heat, insect bite, pregnancy, recent injury)      unknown  8. MEDICAL HISTORY: \"Do you have a history of heart failure, kidney disease, liver failure, or cancer?\"      Iron overload  9. RECURRENT SYMPTOM: \"Have you had hand swelling before?\" If Yes, ask: \"When was the last time?\" \"What happened that time?\"      no  10. OTHER SYMPTOMS: \"Do you have any other symptoms?\" (e.g., blurred vision, difficulty breathing, headache)        No numbness; no other symptoms  11. PREGNANCY: \"Is there any chance you are pregnant?\" \"When was your last menstrual period?\"        No, IUD    Protocols used: Hand Swelling-A-  Shanti Ash RN   "

## 2025-02-26 ENCOUNTER — OFFICE VISIT (OUTPATIENT)
Dept: PEDIATRICS | Facility: CLINIC | Age: 40
End: 2025-02-26
Payer: COMMERCIAL

## 2025-02-26 VITALS
SYSTOLIC BLOOD PRESSURE: 117 MMHG | BODY MASS INDEX: 36.09 KG/M2 | DIASTOLIC BLOOD PRESSURE: 78 MMHG | OXYGEN SATURATION: 96 % | HEIGHT: 65 IN | WEIGHT: 216.6 LBS | TEMPERATURE: 98 F | RESPIRATION RATE: 18 BRPM | HEART RATE: 95 BPM

## 2025-02-26 DIAGNOSIS — F32.0 MAJOR DEPRESSIVE DISORDER, SINGLE EPISODE, MILD: ICD-10-CM

## 2025-02-26 DIAGNOSIS — F41.1 GAD (GENERALIZED ANXIETY DISORDER): ICD-10-CM

## 2025-02-26 DIAGNOSIS — R22.31 LOCALIZED SWELLING ON RIGHT HAND: Primary | ICD-10-CM

## 2025-02-26 DIAGNOSIS — E83.19 IRON OVERLOAD: ICD-10-CM

## 2025-02-26 DIAGNOSIS — F33.0 MILD RECURRENT MAJOR DEPRESSION: ICD-10-CM

## 2025-02-26 DIAGNOSIS — F33.8 SEASONAL AFFECTIVE DISORDER: ICD-10-CM

## 2025-02-26 LAB
ALBUMIN SERPL BCG-MCNC: 4.5 G/DL (ref 3.5–5.2)
ALP SERPL-CCNC: 50 U/L (ref 40–150)
ALT SERPL W P-5'-P-CCNC: 72 U/L (ref 0–50)
AST SERPL W P-5'-P-CCNC: 46 U/L (ref 0–45)
BILIRUB DIRECT SERPL-MCNC: 0.27 MG/DL (ref 0–0.3)
BILIRUB SERPL-MCNC: 0.8 MG/DL
ERYTHROCYTE [DISTWIDTH] IN BLOOD BY AUTOMATED COUNT: 11.7 % (ref 10–15)
FERRITIN SERPL-MCNC: 472 NG/ML (ref 6–175)
HCT VFR BLD AUTO: 42.9 % (ref 35–47)
HGB BLD-MCNC: 15.4 G/DL (ref 11.7–15.7)
IRON BINDING CAPACITY (ROCHE): 273 UG/DL (ref 240–430)
IRON SATN MFR SERPL: 70 % (ref 15–46)
IRON SERPL-MCNC: 192 UG/DL (ref 37–145)
MCH RBC QN AUTO: 31.2 PG (ref 26.5–33)
MCHC RBC AUTO-ENTMCNC: 35.9 G/DL (ref 31.5–36.5)
MCV RBC AUTO: 87 FL (ref 78–100)
PLATELET # BLD AUTO: 326 10E3/UL (ref 150–450)
PROT SERPL-MCNC: 7 G/DL (ref 6.4–8.3)
RBC # BLD AUTO: 4.93 10E6/UL (ref 3.8–5.2)
WBC # BLD AUTO: 6 10E3/UL (ref 4–11)

## 2025-02-26 PROCEDURE — 96127 BRIEF EMOTIONAL/BEHAV ASSMT: CPT | Performed by: PHYSICIAN ASSISTANT

## 2025-02-26 PROCEDURE — 3074F SYST BP LT 130 MM HG: CPT | Performed by: PHYSICIAN ASSISTANT

## 2025-02-26 PROCEDURE — 36415 COLL VENOUS BLD VENIPUNCTURE: CPT | Performed by: PHYSICIAN ASSISTANT

## 2025-02-26 PROCEDURE — 1126F AMNT PAIN NOTED NONE PRSNT: CPT | Performed by: PHYSICIAN ASSISTANT

## 2025-02-26 PROCEDURE — 99214 OFFICE O/P EST MOD 30 MIN: CPT | Performed by: PHYSICIAN ASSISTANT

## 2025-02-26 PROCEDURE — 85027 COMPLETE CBC AUTOMATED: CPT | Performed by: PHYSICIAN ASSISTANT

## 2025-02-26 PROCEDURE — 83550 IRON BINDING TEST: CPT | Performed by: PHYSICIAN ASSISTANT

## 2025-02-26 PROCEDURE — 83540 ASSAY OF IRON: CPT | Performed by: PHYSICIAN ASSISTANT

## 2025-02-26 PROCEDURE — 82728 ASSAY OF FERRITIN: CPT | Performed by: PHYSICIAN ASSISTANT

## 2025-02-26 PROCEDURE — 3078F DIAST BP <80 MM HG: CPT | Performed by: PHYSICIAN ASSISTANT

## 2025-02-26 PROCEDURE — 80076 HEPATIC FUNCTION PANEL: CPT | Performed by: PHYSICIAN ASSISTANT

## 2025-02-26 RX ORDER — CITALOPRAM HYDROBROMIDE 40 MG/1
40 TABLET ORAL DAILY
Qty: 90 TABLET | Refills: 3 | Status: SHIPPED | OUTPATIENT
Start: 2025-02-26

## 2025-02-26 ASSESSMENT — PAIN SCALES - GENERAL: PAINLEVEL_OUTOF10: NO PAIN (0)

## 2025-02-26 NOTE — PROGRESS NOTES
"  Assessment & Plan     RAMÓN (generalized anxiety disorder)  Refilled. Tolerates dose without side effects. Finds it helpful.    - citalopram (CELEXA) 40 MG tablet  Dispense: 90 tablet; Refill: 3    Major depressive disorder, single episode, mild  Refilled x one year.  - citalopram (CELEXA) 40 MG tablet  Dispense: 90 tablet; Refill: 3    Seasonal affective disorder  As above  - citalopram (CELEXA) 40 MG tablet  Dispense: 90 tablet; Refill: 3    Mild recurrent major depression  As above   - citalopram (CELEXA) 40 MG tablet  Dispense: 90 tablet; Refill: 3    Iron overload  DR Faria labs  - CBC with platelets  - Iron and iron binding capacity  - Ferritin  - Hepatic panel (Albumin, ALT, AST, Bili, Alk Phos, TP)      Localized  swelling hand right side  -  one day history, no trauma. Not associated with pain, redness or warmth. No other joint or muscle  pain. Will hold on testing for autoimmune causes at this time, however if it becomes more consistent, associated with other joint pain, or redness, warmth or tenderness to palpate then will start work up.  Can let me know if she chooses to do this in an e-visit or follow up with pcp  The patient indicates understanding of these issues and agrees with the plan.     BMI  Estimated body mass index is 36.04 kg/m  as calculated from the following:    Height as of this encounter: 1.651 m (5' 5\").    Weight as of this encounter: 98.2 kg (216 lb 9.6 oz).             Subjective   Lou is a 39 year old, presenting for the following health issues:  Musculoskeletal Problem (swelling)    Mostly R hand swelling- yesterday- comppletely random no pain or itching    2/26/2025     8:09 AM   Additional Questions   Roomed by manjeet   Accompanied by angie         2/26/2025     8:09 AM   Patient Reported Additional Medications   Patient reports taking the following new medications na     Musculoskeletal Problem    History of Present Illness       Reason for visit:  Hand swelling  Symptom onset:  " "1-3 days ago  Symptom intensity:  Mild  Symptom progression:  Staying the same  Had these symptoms before:  No   She is taking medications regularly.         Right hand, one day hx of swelling. No trauma. Used a new lotion in clinic and noted some swelling when she was putting lotion on.   No redness or warmth  No pain  Just some swelling.  Hx of elevated ferttin so she is concerned about autoimmune issues.  No family hx  Follows with Dr Faria    She is due for refill of Celexa, tolerates well. No concerns.     PHQ-9 score:        2/26/2025     8:08 AM   PHQ   PHQ-9 Total Score 9    Q9: Thoughts of better off dead/self-harm past 2 weeks Not at all       Patient-reported                              Objective    /78 (BP Location: Right arm, Patient Position: Sitting, Cuff Size: Adult Large)   Pulse 95   Temp 98  F (36.7  C) (Tympanic)   Resp 18   Ht 1.651 m (5' 5\")   Wt 98.2 kg (216 lb 9.6 oz)   LMP  (LMP Unknown)   SpO2 96%   BMI 36.04 kg/m    Body mass index is 36.04 kg/m .  Physical Exam   GENERAL: alert and no distress  MS: right hand, very minimal swelling noted dorsum of right hand over second and third extensor tendons. No tenderness to palpate. No redness or warmth. Full arom. Denies any axilla symptoms   SKIN: no suspicious lesions or rashes            Signed Electronically by: Yanet Linares PA-C    "

## 2025-02-27 NOTE — RESULT ENCOUNTER NOTE
Dear Ms. Griffith,    Iron and ferritin are elevated. Liver blood tests (AST and ALT) is also mildly elevated. Please, see me in clinic to review it.    Please, call me with any questions.    James Faria MD

## 2025-03-29 ENCOUNTER — MYC MEDICAL ADVICE (OUTPATIENT)
Dept: PEDIATRICS | Facility: CLINIC | Age: 40
End: 2025-03-29
Payer: COMMERCIAL

## 2025-03-31 NOTE — TELEPHONE ENCOUNTER
Spoke with Lou (consent to communicate on file). And scheduled. Informed Lou about not having information about one patient in another's chart. This includes MyC messages.    Manuela Kumari on 3/31/2025 at 11:40 AM

## 2025-04-29 ENCOUNTER — ONCOLOGY VISIT (OUTPATIENT)
Dept: ONCOLOGY | Facility: CLINIC | Age: 40
End: 2025-04-29
Attending: INTERNAL MEDICINE
Payer: COMMERCIAL

## 2025-04-29 VITALS
WEIGHT: 218 LBS | DIASTOLIC BLOOD PRESSURE: 79 MMHG | HEART RATE: 87 BPM | BODY MASS INDEX: 36.28 KG/M2 | SYSTOLIC BLOOD PRESSURE: 124 MMHG | OXYGEN SATURATION: 98 % | RESPIRATION RATE: 16 BRPM

## 2025-04-29 DIAGNOSIS — E83.19 IRON OVERLOAD: Primary | ICD-10-CM

## 2025-04-29 PROCEDURE — 99213 OFFICE O/P EST LOW 20 MIN: CPT | Performed by: INTERNAL MEDICINE

## 2025-04-29 PROCEDURE — 99214 OFFICE O/P EST MOD 30 MIN: CPT | Performed by: INTERNAL MEDICINE

## 2025-04-29 PROCEDURE — G2211 COMPLEX E/M VISIT ADD ON: HCPCS | Performed by: INTERNAL MEDICINE

## 2025-04-29 ASSESSMENT — PAIN SCALES - GENERAL: PAINLEVEL_OUTOF10: NO PAIN (0)

## 2025-04-29 NOTE — LETTER
"    4/29/2025         RE: Lou Griffith  [id number]  [facility] [address]      Oncology Rooming Note    April 29, 2025 8:18 AM   Lou Griffith is a 39 year old female who presents for:    Chief Complaint   Patient presents with     Oncology Clinic Visit     Initial Vitals: /79   Pulse 87   Resp 16   Wt 98.9 kg (218 lb)   SpO2 98%   BMI 36.28 kg/m   Estimated body mass index is 36.28 kg/m  as calculated from the following:    Height as of 2/26/25: 1.651 m (5' 5\").    Weight as of this encounter: 98.9 kg (218 lb). Body surface area is 2.13 meters squared.  No Pain (0) Comment: Data Unavailable   No LMP recorded. (Menstrual status: IUD).  Allergies reviewed: Yes  Medications reviewed: Yes    Medications: Medication refills not needed today.  Pharmacy name entered into [employer]:    [id number] [address] AT [address] [facility] #[id number] [place], [place] - [address] [facility]  AT [facility]    Frailty Screening:   Is the patient here for a new oncology consult visit in cancer care? 2. No    PHQ9:  Did this patient require a PHQ9?: No      Clinical concerns:   doctor was notified.      Staci Vann, [facility]              HEMATOLOGY HISTORY: Mrs. Griffith is a female with iron overload.    1.  On 05/24/2023:  -Iron of 185 and iron saturation index of 71%.  -Ferritin of 376.    2.  On 06/28/2023:  -Iron of 201 and iron saturation index of 75%.  -Ferritin of 396.  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    3.  On 11/27/2023, iron of 189, iron saturation index of 67% and ferritin of 437.     Subjective:  Mrs. Griffith is a 39-year-old female with iron overload.  Iron, iron saturation index and ferritin are elevated. She is homozygous for H63D.     She was advised to donate blood.  She donated blood in September and December 2024.      She has Mirena IUD.  She gets spotting only.     She feels tired all the " time.  She is working and is able to do all her activities.     She intermittently gets some pain in the small joints of the hand.  Patient says it could be work-related.      No headache. No dizziness.  No chest pain.  No shortness of breath.  No abdominal pain.  No nausea or vomiting.  No urinary or bowel complaints.      Exam:  Patient is alert and oriented x 3.  Not in distress.  Rest of the system is not examined as this is a video visit.     Labs: CBC, LFT, iron and ferritin reviewed.     Assessment:  1.  A 39-year-old female with iron overload.  2.  Homozygosity for H63D.  3.  Generalized weakness.  4.  Elevated AST and ALT.     Plan:  -Donate blood every 1-2 months.  -MRI in next few weeks.  -Follow-up in 6 months with labs.     Discussion:  1.  Labs were reviewed with the patient.  CBC is normal.  Iron, iron saturation index and ferritin are elevated.  LFT reveals mildly elevated AST and ALT.    Discussed with her regarding iron overload.  Explained to her it has not improved.  Her AST and ALT are mildly elevated.  Could be from iron overload.  She also gets some aches and pain in the small joints of the hand.  It can be related to work or could be related to iron overload.     Discussed regarding treatment of iron overload. I had advised her to donate blood frequently.  She has donated blood only twice.    Advised the patient to donate blood every 1 to 2 months.  Explained to her that alternatively she can have phlebotomy done in clinic.  Patient mentioned that she has been busy.  She will try to donate blood.    It will be interesting to see if her fatigue and joint pain improves with improvement in iron overload.    2.  Will get MRI ferriscan to evaluate liver iron stores.    3.  She had few questions which were all answered.  I will see her in 6 months time with labs.    Total visit time of 30 minutes.  Time spent in today's visit, review of chart/investigations today and documentation  today.        James Faria MD

## 2025-04-29 NOTE — PROGRESS NOTES
"Oncology Rooming Note    April 29, 2025 8:18 AM   Lou Griffith is a 39 year old female who presents for:    Chief Complaint   Patient presents with    Oncology Clinic Visit     Initial Vitals: /79   Pulse 87   Resp 16   Wt 98.9 kg (218 lb)   SpO2 98%   BMI 36.28 kg/m   Estimated body mass index is 36.28 kg/m  as calculated from the following:    Height as of 2/26/25: 1.651 m (5' 5\").    Weight as of this encounter: 98.9 kg (218 lb). Body surface area is 2.13 meters squared.  No Pain (0) Comment: Data Unavailable   No LMP recorded. (Menstrual status: IUD).  Allergies reviewed: Yes  Medications reviewed: Yes    Medications: Medication refills not needed today.  Pharmacy name entered into [facility]:    [facility] #[id number] [facility] [address] AT [address] [facility]ORE #[id number] [place], [place] - [address] [facility]  AT [facility]    Frailty Screening:   Is the patient here for a new oncology consult visit in cancer care? 2. No    PHQ9:  Did this patient require a PHQ9?: No      Clinical concerns:   doctor was notified.      Staci Vann CMA            "

## 2025-04-29 NOTE — LETTER
"4/29/2025      Lou Griffith  [address] [facility] [place] [address]      Dear Colleague,    Thank you for referring your patient, Lou Griffith, to the [facility]. Please see a copy of my visit note below.    Oncology Rooming Note    April 29, 2025 8:18 AM   Lou Griffith is a 39 year old female who presents for:    Chief Complaint   Patient presents with     Oncology Clinic Visit     Initial Vitals: /79   Pulse 87   Resp 16   Wt 98.9 kg (218 lb)   SpO2 98%   BMI 36.28 kg/m   Estimated body mass index is 36.28 kg/m  as calculated from the following:    Height as of 2/26/25: 1.651 m (5' 5\").    Weight as of this encounter: 98.9 kg (218 lb). Body surface area is 2.13 meters squared.  No Pain (0) Comment: Data Unavailable   No LMP recorded. (Menstrual status: IUD).  Allergies reviewed: Yes  Medications reviewed: Yes    Medications: Medication refills not needed today.  Pharmacy name entered into [employer]:    [id number] [place], [place] - [address] AT [facility] [address] [id number] [place], [place] - [address] [facility]  AT [facility]    Frailty Screening:   Is the patient here for a new oncology consult visit in cancer care? 2. No    PHQ9:  Did this patient require a PHQ9?: No      Clinical concerns:   doctor was notified.      Staci Vann, [facility]              HEMATOLOGY HISTORY: Mrs. Griffith is a female with iron overload.    1.  On 05/24/2023:  -Iron of 185 and iron saturation index of 71%.  -Ferritin of 376.    2.  On 06/28/2023:  -Iron of 201 and iron saturation index of 75%.  -Ferritin of 396.  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    3.  On 11/27/2023, iron of 189, iron saturation index of 67% and ferritin of 437.     Subjective:  Mrs. Griffith is a 39-year-old female with iron overload.  Iron, iron saturation index and ferritin are elevated. She is homozygous for H63D.     She was " advised to donate blood.  She donated blood in September and December 2024.      She has Mirena IUD.  She gets spotting only.     She feels tired all the time.  She is working and is able to do all her activities.     She intermittently gets some pain in the small joints of the hand.  Patient says it could be work-related.      No headache. No dizziness.  No chest pain.  No shortness of breath.  No abdominal pain.  No nausea or vomiting.  No urinary or bowel complaints.      Exam:  Patient is alert and oriented x 3.  Not in distress.  Rest of the system is not examined as this is a video visit.     Labs: CBC, LFT, iron and ferritin reviewed.     Assessment:  1.  A 39-year-old female with iron overload.  2.  Homozygosity for H63D.  3.  Generalized weakness.  4.  Elevated AST and ALT.     Plan:  -Donate blood every 1-2 months.  -MRI in next few weeks.  -Follow-up in 6 months with labs.     Discussion:  1.  Labs were reviewed with the patient.  CBC is normal.  Iron, iron saturation index and ferritin are elevated.  LFT reveals mildly elevated AST and ALT.    Discussed with her regarding iron overload.  Explained to her it has not improved.  Her AST and ALT are mildly elevated.  Could be from iron overload.  She also gets some aches and pain in the small joints of the hand.  It can be related to work or could be related to iron overload.     Discussed regarding treatment of iron overload. I had advised her to donate blood frequently.  She has donated blood only twice.    Advised the patient to donate blood every 1 to 2 months.  Explained to her that alternatively she can have phlebotomy done in clinic.  Patient mentioned that she has been busy.  She will try to donate blood.    It will be interesting to see if her fatigue and joint pain improves with improvement in iron overload.    2.  Will get MRI ferriscan to evaluate liver iron stores.    3.  She had few questions which were all answered.  I will see her in 6 months  time with labs.    Total visit time of 30 minutes.  Time spent in today's visit, review of chart/investigations today and documentation today.      Again, thank you for allowing me to participate in the care of your patient.        Sincerely,        James Faria MD    Electronically signed

## 2025-04-30 NOTE — PROGRESS NOTES
HEMATOLOGY HISTORY: Mrs. Griffith is a female with iron overload.    1.  On 05/24/2023:  -Iron of 185 and iron saturation index of 71%.  -Ferritin of 376.    2.  On 06/28/2023:  -Iron of 201 and iron saturation index of 75%.  -Ferritin of 396.  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    3.  On 11/27/2023, iron of 189, iron saturation index of 67% and ferritin of 437.     Subjective:  Mrs. Griffith is a 39-year-old female with iron overload.  Iron, iron saturation index and ferritin are elevated. She is homozygous for H63D.     She was advised to donate blood.  She donated blood in September and December 2024.      She has Mirena IUD.  She gets spotting only.     She feels tired all the time.  She is working and is able to do all her activities.     She intermittently gets some pain in the small joints of the hand.  Patient says it could be work-related.      No headache. No dizziness.  No chest pain.  No shortness of breath.  No abdominal pain.  No nausea or vomiting.  No urinary or bowel complaints.      Exam:  Patient is alert and oriented x 3.  Not in distress.  Rest of the system is not examined as this is a video visit.     Labs: CBC, LFT, iron and ferritin reviewed.     Assessment:  1.  A 39-year-old female with iron overload.  2.  Homozygosity for H63D.  3.  Generalized weakness.  4.  Elevated AST and ALT.     Plan:  -Donate blood every 1-2 months.  -MRI in next few weeks.  -Follow-up in 6 months with labs.     Discussion:  1.  Labs were reviewed with the patient.  CBC is normal.  Iron, iron saturation index and ferritin are elevated.  LFT reveals mildly elevated AST and ALT.    Discussed with her regarding iron overload.  Explained to her it has not improved.  Her AST and ALT are mildly elevated.  Could be from iron overload.  She also gets some aches and pain in the small joints of the hand.  It can be related to work or could be related to iron overload.     Discussed regarding treatment of iron  overload. I had advised her to donate blood frequently.  She has donated blood only twice.    Advised the patient to donate blood every 1 to 2 months.  Explained to her that alternatively she can have phlebotomy done in clinic.  Patient mentioned that she has been busy.  She will try to donate blood.    It will be interesting to see if her fatigue and joint pain improves with improvement in iron overload.    2.  Will get MRI ferriscan to evaluate liver iron stores.    3.  She had few questions which were all answered.  I will see her in 6 months time with labs.    Total visit time of 30 minutes.  Time spent in today's visit, review of chart/investigations today and documentation today.

## 2025-05-06 ENCOUNTER — PATIENT OUTREACH (OUTPATIENT)
Dept: CARE COORDINATION | Facility: CLINIC | Age: 40
End: 2025-05-06
Payer: COMMERCIAL

## 2025-05-16 ENCOUNTER — RESULTS FOLLOW-UP (OUTPATIENT)
Dept: ONCOLOGY | Facility: CLINIC | Age: 40
End: 2025-05-16

## 2025-05-16 ENCOUNTER — HOSPITAL ENCOUNTER (OUTPATIENT)
Dept: MRI IMAGING | Facility: CLINIC | Age: 40
Discharge: HOME | End: 2025-05-16
Attending: INTERNAL MEDICINE | Admitting: INTERNAL MEDICINE
Payer: COMMERCIAL

## 2025-05-16 DIAGNOSIS — E83.19 IRON OVERLOAD: ICD-10-CM

## 2025-05-16 PROCEDURE — 74181 MRI ABDOMEN W/O CONTRAST: CPT | Mod: 26 | Performed by: RADIOLOGY

## 2025-05-16 PROCEDURE — 74181 MRI ABDOMEN W/O CONTRAST: CPT

## 2025-05-16 NOTE — RESULT ENCOUNTER NOTE
Dear Ms. Griffith,    MRI of liver is good. No evidence of excess iron deposition in liver. Continue with blood donation.    Please, call me with any questions.    James Faria MD

## 2025-06-01 SDOH — HEALTH STABILITY: PHYSICAL HEALTH: ON AVERAGE, HOW MANY MINUTES DO YOU ENGAGE IN EXERCISE AT THIS LEVEL?: 30 MIN

## 2025-06-01 SDOH — HEALTH STABILITY: PHYSICAL HEALTH: ON AVERAGE, HOW MANY DAYS PER WEEK DO YOU ENGAGE IN MODERATE TO STRENUOUS EXERCISE (LIKE A BRISK WALK)?: 2 DAYS

## 2025-06-01 ASSESSMENT — SOCIAL DETERMINANTS OF HEALTH (SDOH): HOW OFTEN DO YOU GET TOGETHER WITH FRIENDS OR RELATIVES?: ONCE A WEEK

## 2025-06-02 ENCOUNTER — OFFICE VISIT (OUTPATIENT)
Dept: PEDIATRICS | Facility: CLINIC | Age: 40
End: 2025-06-02
Attending: PEDIATRICS
Payer: COMMERCIAL

## 2025-06-02 VITALS
TEMPERATURE: 98 F | DIASTOLIC BLOOD PRESSURE: 70 MMHG | RESPIRATION RATE: 18 BRPM | HEIGHT: 66 IN | BODY MASS INDEX: 34.55 KG/M2 | HEART RATE: 66 BPM | SYSTOLIC BLOOD PRESSURE: 118 MMHG | WEIGHT: 215 LBS | OXYGEN SATURATION: 94 %

## 2025-06-02 DIAGNOSIS — F41.1 GAD (GENERALIZED ANXIETY DISORDER): ICD-10-CM

## 2025-06-02 DIAGNOSIS — Z97.5 IUD (INTRAUTERINE DEVICE) IN PLACE: ICD-10-CM

## 2025-06-02 DIAGNOSIS — Z15.89 BIALLELIC MUTATION OF HFE GENE: ICD-10-CM

## 2025-06-02 DIAGNOSIS — F33.0 MILD RECURRENT MAJOR DEPRESSION: ICD-10-CM

## 2025-06-02 DIAGNOSIS — F32.0 MAJOR DEPRESSIVE DISORDER, SINGLE EPISODE, MILD: ICD-10-CM

## 2025-06-02 DIAGNOSIS — F33.8 SEASONAL AFFECTIVE DISORDER: ICD-10-CM

## 2025-06-02 DIAGNOSIS — F33.1 MODERATE RECURRENT MAJOR DEPRESSION (H): ICD-10-CM

## 2025-06-02 DIAGNOSIS — E83.19 IRON OVERLOAD: ICD-10-CM

## 2025-06-02 DIAGNOSIS — E55.9 VITAMIN D DEFICIENCY: ICD-10-CM

## 2025-06-02 DIAGNOSIS — Z00.00 ROUTINE GENERAL MEDICAL EXAMINATION AT A HEALTH CARE FACILITY: Primary | ICD-10-CM

## 2025-06-02 DIAGNOSIS — G43.009 MIGRAINE WITHOUT AURA AND WITHOUT STATUS MIGRAINOSUS, NOT INTRACTABLE: ICD-10-CM

## 2025-06-02 PROCEDURE — G2211 COMPLEX E/M VISIT ADD ON: HCPCS | Performed by: PEDIATRICS

## 2025-06-02 PROCEDURE — 99395 PREV VISIT EST AGE 18-39: CPT | Performed by: PEDIATRICS

## 2025-06-02 PROCEDURE — 80053 COMPREHEN METABOLIC PANEL: CPT | Performed by: PEDIATRICS

## 2025-06-02 PROCEDURE — 3074F SYST BP LT 130 MM HG: CPT | Performed by: PEDIATRICS

## 2025-06-02 PROCEDURE — 80061 LIPID PANEL: CPT | Performed by: PEDIATRICS

## 2025-06-02 PROCEDURE — 82728 ASSAY OF FERRITIN: CPT | Performed by: PEDIATRICS

## 2025-06-02 PROCEDURE — 3078F DIAST BP <80 MM HG: CPT | Performed by: PEDIATRICS

## 2025-06-02 PROCEDURE — 99214 OFFICE O/P EST MOD 30 MIN: CPT | Mod: 25 | Performed by: PEDIATRICS

## 2025-06-02 PROCEDURE — 84443 ASSAY THYROID STIM HORMONE: CPT | Performed by: PEDIATRICS

## 2025-06-02 PROCEDURE — 83550 IRON BINDING TEST: CPT | Performed by: PEDIATRICS

## 2025-06-02 PROCEDURE — 83540 ASSAY OF IRON: CPT | Performed by: PEDIATRICS

## 2025-06-02 PROCEDURE — 36415 COLL VENOUS BLD VENIPUNCTURE: CPT | Performed by: PEDIATRICS

## 2025-06-02 PROCEDURE — 82306 VITAMIN D 25 HYDROXY: CPT | Performed by: PEDIATRICS

## 2025-06-02 PROCEDURE — 1126F AMNT PAIN NOTED NONE PRSNT: CPT | Performed by: PEDIATRICS

## 2025-06-02 PROCEDURE — 96127 BRIEF EMOTIONAL/BEHAV ASSMT: CPT | Performed by: PEDIATRICS

## 2025-06-02 RX ORDER — BUPROPION HYDROCHLORIDE 150 MG/1
150 TABLET ORAL EVERY MORNING
Qty: 90 TABLET | Refills: 3 | Status: SHIPPED | OUTPATIENT
Start: 2025-06-02

## 2025-06-02 RX ORDER — CITALOPRAM HYDROBROMIDE 40 MG/1
40 TABLET ORAL DAILY
Qty: 90 TABLET | Refills: 3 | Status: SHIPPED | OUTPATIENT
Start: 2025-06-02

## 2025-06-02 RX ORDER — SUMATRIPTAN SUCCINATE 100 MG/1
TABLET ORAL
Qty: 18 TABLET | Refills: 11 | Status: SHIPPED | OUTPATIENT
Start: 2025-06-02

## 2025-06-02 ASSESSMENT — PAIN SCALES - GENERAL: PAINLEVEL_OUTOF10: NO PAIN (0)

## 2025-06-02 NOTE — PATIENT INSTRUCTIONS
Patient Education   Preventive Care Advice   This is general advice given by our system to help you stay healthy. However, your care team may have specific advice just for you. Please talk to your care team about your preventive care needs.  Nutrition  Eat 5 or more servings of fruits and vegetables each day.  Try wheat bread, brown rice and whole grain pasta (instead of white bread, rice, and pasta).  Get enough calcium and vitamin D. Check the label on foods and aim for 100% of the RDA (recommended daily allowance).  Lifestyle  Exercise at least 150 minutes each week  (30 minutes a day, 5 days a week).  Do muscle strengthening activities 2 days a week. These help control your weight and prevent disease.  No smoking.  Wear sunscreen to prevent skin cancer.  Have a dental exam and cleaning every 6 months.  Yearly exams  See your health care team every year to talk about:  Any changes in your health.  Any medicines your care team has prescribed.  Preventive care, family planning, and ways to prevent chronic diseases.  Shots (vaccines)   HPV shots (up to age 26), if you've never had them before.  Hepatitis B shots (up to age 59), if you've never had them before.  COVID-19 shot: Get this shot when it's due.  Flu shot: Get a flu shot every year.  Tetanus shot: Get a tetanus shot every 10 years.  Pneumococcal, hepatitis A, and RSV shots: Ask your care team if you need these based on your risk.  Shingles shot (for age 50 and up)  General health tests  Diabetes screening:  Starting at age 35, Get screened for diabetes at least every 3 years.  If you are younger than age 35, ask your care team if you should be screened for diabetes.  Cholesterol test: At age 39, start having a cholesterol test every 5 years, or more often if advised.  Bone density scan (DEXA): At age 50, ask your care team if you should have this scan for osteoporosis (brittle bones).  Hepatitis C: Get tested at least once in your life.  STIs (sexually  transmitted infections)  Before age 24: Ask your care team if you should be screened for STIs.  After age 24: Get screened for STIs if you're at risk. You are at risk for STIs (including HIV) if:  You are sexually active with more than one person.  You don't use condoms every time.  You or a partner was diagnosed with a sexually transmitted infection.  If you are at risk for HIV, ask about PrEP medicine to prevent HIV.  Get tested for HIV at least once in your life, whether you are at risk for HIV or not.  Cancer screening tests  Cervical cancer screening: If you have a cervix, begin getting regular cervical cancer screening tests starting at age 21.  Breast cancer scan (mammogram): If you've ever had breasts, begin having regular mammograms starting at age 40. This is a scan to check for breast cancer.  Colon cancer screening: It is important to start screening for colon cancer at age 45.  Have a colonoscopy test every 10 years (or more often if you're at risk) Or, ask your provider about stool tests like a FIT test every year or Cologuard test every 3 years.  To learn more about your testing options, visit:   .  For help making a decision, visit:   https://bit.ly/dh40257.  Prostate cancer screening test: If you have a prostate, ask your care team if a prostate cancer screening test (PSA) at age 55 is right for you.  Lung cancer screening: If you are a current or former smoker ages 50 to 80, ask your care team if ongoing lung cancer screenings are right for you.  For informational purposes only. Not to replace the advice of your health care provider. Copyright   2023 [employer]. All rights reserved. Clinically reviewed by the [facility] Transitions Program. [employer] [id number] - REV 01/24.

## 2025-06-02 NOTE — PROGRESS NOTES
Preventive Care Visit  [facility] YENY Arreguin MD, Internal Medicine - Pediatrics  Jun 2, 2025      Assessment & Plan       ICD-10-CM    1. Routine general medical examination at a health care facility  Z00.00 PRIMARY CARE FOLLOW-UP SCHEDULING     Vitamin D Deficiency     Lipid panel reflex to direct LDL Fasting     Comprehensive metabolic panel (BMP + Alb, Alk Phos, ALT, AST, Total. Bili, TP)     PRIMARY CARE FOLLOW-UP SCHEDULING     TSH with free T4 reflex     Iron and iron binding capacity     Ferritin     Vitamin D Deficiency     Lipid panel reflex to direct LDL Fasting     Comprehensive metabolic panel (BMP + Alb, Alk Phos, ALT, AST, Total. Bili, TP)     TSH with free T4 reflex     Iron and iron binding capacity     Ferritin      2. Moderate recurrent major depression (H)  F33.1 buPROPion (WELLBUTRIN XL) 150 MG 24 hr tablet    Suboptimally controlled.  Discussed considering counseling and adding wellbutrin.  New med reviewed.   Patient will send me [id number] message with update in how she is doing in about 4 weeks      3. Seasonal affective disorder  F33.8 buPROPion (WELLBUTRIN XL) 150 MG 24 hr tablet     citalopram (CELEXA) 40 MG tablet  See above      4. Migraine without aura and without status migrainosus, not intractable  G43.009 SUMAtriptan (IMITREX) 100 MG tablet    Very well controlled, rare triptan use      5. RAMÓN (generalized anxiety disorder)  F41.1 citalopram (CELEXA) 40 MG tablet  Well controlled, continue current medications        6. 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 IUD (intrauterine device) in place - Mirena 10/2024  Z97.5       9. Vitamin D deficiency  E55.9 Vitamin D Deficiency     Vitamin D Deficiency      10. Major depressive disorder,  "single episode, mild  F32.0 citalopram (CELEXA) 40 MG tablet      11. Mild recurrent major depression  F33.0 citalopram (CELEXA) 40 MG tablet          The longitudinal plan of care for the diagnosis(es)/condition(s) as documented were addressed during this visit. Due to the added complexity in care, I will continue to support Lou in the subsequent management and with ongoing continuity of care.       BMI  Estimated body mass index is 35.23 kg/m  as calculated from the following:    Height as of this encounter: 1.664 m (5' 5.5\").    Weight as of this encounter: 97.5 kg (215 lb).   Weight management plan: Discussed healthy diet and exercise guidelines    Counseling  Appropriate preventive services were addressed with this patient via screening, questionnaire, or discussion as appropriate   Subjective   Lou is a 40 year old, presenting for the following:  Physical        6/2/2025     9:51 AM   Additional Questions   Roomed by Hanna GALLEGO CMA   Accompanied by self         6/2/2025     9:51 AM   Patient Reported Additional Medications   Patient reports taking the following new medications n/a          HPI    Advance Care Planning    Discussed advance care planning with patient; informed AVS has link to [employer].        6/1/2025   General Health   How would you rate your overall physical health? (!) FAIR   Feel stress (tense, anxious, or unable to sleep) Rather much   (!) STRESS CONCERN      6/1/2025   Nutrition   Three or more servings of calcium each day? Yes   Diet: Regular (no restrictions)   How many servings of fruit and vegetables per day? (!) 2-3   How many sweetened beverages each day? 0-1         6/1/2025   Exercise   Days per week of moderate/strenous exercise 2 days   Average minutes spent exercising at this level 30 min   (!) EXERCISE CONCERN      6/1/2025   Social Factors   Frequency of gathering with friends or relatives Once a week   Worry food won't last until get money to buy more No "   Food not last or not have enough money for food? No   Do you have housing? (Housing is defined as stable permanent housing and does not include staying outside in a car, in a tent, in an abandoned building, in an overnight shelter, or couch-surfing.) Yes   Are you worried about losing your housing? No   Lack of transportation? No   Unable to get utilities (heat,electricity)? No         6/1/2025   Dental   Dentist two times every year? Yes       Today's PHQ-9 Score:       6/1/2025     1:06 PM   PHQ-9 SCORE   PHQ-9 Total Score MyChart 12 (Moderate depression)   PHQ-9 Total Score 12        Patient-reported         6/1/2025   Substance Use   Alcohol more than 3/day or more than 7/wk No   Do you use any other substances recreationally? No     Social History     Tobacco Use    Smoking status: Never     Passive exposure: Never    Smokeless tobacco: Never   Vaping Use    Vaping status: Never Used   Substance Use Topics    Alcohol use: Not Currently     Comment: once or twice q 3-4 months    Drug use: No           6/1/2023   LAST FHS-7 RESULTS   1st degree relative breast or ovarian cancer No   Any relative bilateral breast cancer No   Any male have breast cancer No   Any ONE woman have BOTH breast AND ovarian cancer No   Any woman with breast cancer before 50yrs No   2 or more relatives with breast AND/OR ovarian cancer No   2 or more relatives with breast AND/OR bowel cancer Yes        Mammogram Screening - Patient under 40 years of age: Routine Mammogram Screening not recommended.         6/1/2025   STI Screening   New sexual partner(s) since last STI/HIV test? No     History of abnormal Pap smear: No - age 30-64 HPV with reflex Pap every 5 years recommended        Latest Ref Rng & Units 5/24/2023     7:57 AM 12/31/2019     1:58 PM 12/31/2019     1:55 PM   PAP / HPV   PAP  Negative for Intraepithelial Lesion or Malignancy (NILM)      PAP (Historical)   NIL     HPV 16 DNA Negative Negative   Negative    HPV 18 DNA  "Negative Negative   Negative    Other HR HPV Negative Negative   Negative            6/1/2025   Contraception/Family Planning   Questions about contraception or family planning No        Reviewed and updated as needed this visit by Provider                    IUD replaced with Ob/Gyn last October - no periods    Depression/anxiety - very stressful year, some difficulty sleeping due to ruminating thoughts. Very tired.  Tolerating celexa well.  Open to additional treatment.    Iron overload - following with heme-onc and donating blood on a regular basis - due for donating    Migraines - very well controlled          ROS: 10 point ROS neg other than the symptoms noted above in the HPI.       Objective    Exam  /70 (BP Location: Right arm, Patient Position: Sitting)   Pulse 66   Temp 98  F (36.7  C) (Temporal)   Resp 18   Ht 1.664 m (5' 5.5\")   Wt 97.5 kg (215 lb)   SpO2 94%   BMI 35.23 kg/m     Estimated body mass index is 35.23 kg/m  as calculated from the following:    Height as of this encounter: 1.664 m (5' 5.5\").    Weight as of this encounter: 97.5 kg (215 lb).  Wt Readings from Last 4 Encounters:   06/02/25 97.5 kg (215 lb)   04/29/25 98.9 kg (218 lb)   02/26/25 98.2 kg (216 lb 9.6 oz)   08/29/24 86.2 kg (190 lb)         Physical Exam  GENERAL: alert and no distress  EYES: Eyes grossly normal to inspection, PERRL and conjunctivae and sclerae normal  HENT: ear canals and TM's normal, nose and mouth without ulcers or lesions  NECK: no adenopathy, no asymmetry, masses, or scars  RESP: lungs clear to auscultation - no rales, rhonchi or wheezes  CV: regular rate and rhythm, normal S1 S2, no S3 or S4, no murmur, click or rub, no peripheral edema  ABDOMEN: soft, nontender, no hepatosplenomegaly, no masses and bowel sounds normal  MS: no gross musculoskeletal defects noted, no edema  SKIN: no suspicious lesions or rashes  NEURO: Normal strength and tone, mentation intact and speech normal  PSYCH: mentation " appears normal, affect normal/bright        Signed Electronically by: Rita Arreguin MD    Answers submitted by the patient for this visit:  Patient Health Questionnaire (Submitted on 6/1/2025)  If you checked off any problems, how difficult have these problems made it for you to do your work, take care of things at home, or get along with other people?: Somewhat difficult  PHQ9 TOTAL SCORE: 12

## 2025-06-03 LAB
ALBUMIN SERPL BCG-MCNC: 4.4 G/DL (ref 3.5–5.2)
ALP SERPL-CCNC: 47 U/L (ref 40–150)
ALT SERPL W P-5'-P-CCNC: 51 U/L (ref 0–50)
ANION GAP SERPL CALCULATED.3IONS-SCNC: 11 MMOL/L (ref 7–15)
AST SERPL W P-5'-P-CCNC: 34 U/L (ref 0–45)
BILIRUB SERPL-MCNC: 0.6 MG/DL
BUN SERPL-MCNC: 13.9 MG/DL (ref 6–20)
CALCIUM SERPL-MCNC: 9.5 MG/DL (ref 8.8–10.4)
CHLORIDE SERPL-SCNC: 106 MMOL/L (ref 98–107)
CHOLEST SERPL-MCNC: 206 MG/DL
CREAT SERPL-MCNC: 0.93 MG/DL (ref 0.51–0.95)
EGFRCR SERPLBLD CKD-EPI 2021: 80 ML/MIN/1.73M2
FASTING STATUS PATIENT QL REPORTED: YES
FASTING STATUS PATIENT QL REPORTED: YES
FERRITIN SERPL-MCNC: 448 NG/ML (ref 6–175)
GLUCOSE SERPL-MCNC: 91 MG/DL (ref 70–99)
HCO3 SERPL-SCNC: 23 MMOL/L (ref 22–29)
HDLC SERPL-MCNC: 36 MG/DL
IRON BINDING CAPACITY (ROCHE): 257 UG/DL (ref 240–430)
IRON SATN MFR SERPL: 43 % (ref 15–46)
IRON SERPL-MCNC: 111 UG/DL (ref 37–145)
LDLC SERPL CALC-MCNC: 150 MG/DL
NONHDLC SERPL-MCNC: 170 MG/DL
POTASSIUM SERPL-SCNC: 4.5 MMOL/L (ref 3.4–5.3)
PROT SERPL-MCNC: 6.7 G/DL (ref 6.4–8.3)
SODIUM SERPL-SCNC: 140 MMOL/L (ref 135–145)
TRIGL SERPL-MCNC: 99 MG/DL
TSH SERPL DL<=0.005 MIU/L-ACNC: 0.94 UIU/ML (ref 0.3–4.2)
VIT D+METAB SERPL-MCNC: 32 NG/ML (ref 20–50)

## 2025-06-04 ENCOUNTER — RESULTS FOLLOW-UP (OUTPATIENT)
Dept: PEDIATRICS | Facility: CLINIC | Age: 40
End: 2025-06-04

## 2025-08-14 ENCOUNTER — MYC MEDICAL ADVICE (OUTPATIENT)
Dept: PEDIATRICS | Facility: CLINIC | Age: 40
End: 2025-08-14
Payer: COMMERCIAL

## 2025-08-14 DIAGNOSIS — F41.1 GAD (GENERALIZED ANXIETY DISORDER): Primary | ICD-10-CM

## 2025-08-14 RX ORDER — HYDROXYZINE HYDROCHLORIDE 25 MG/1
25 TABLET, FILM COATED ORAL 3 TIMES DAILY PRN
Qty: 30 TABLET | Refills: 5 | Status: SHIPPED | OUTPATIENT
Start: 2025-08-14